# Patient Record
Sex: FEMALE | Race: WHITE | Employment: FULL TIME | URBAN - METROPOLITAN AREA
[De-identification: names, ages, dates, MRNs, and addresses within clinical notes are randomized per-mention and may not be internally consistent; named-entity substitution may affect disease eponyms.]

---

## 2017-02-24 ENCOUNTER — LAB ENCOUNTER (OUTPATIENT)
Dept: LAB | Age: 57
End: 2017-02-24
Attending: FAMILY MEDICINE
Payer: COMMERCIAL

## 2017-02-24 ENCOUNTER — OFFICE VISIT (OUTPATIENT)
Dept: INTERNAL MEDICINE CLINIC | Facility: CLINIC | Age: 57
End: 2017-02-24

## 2017-02-24 VITALS
HEIGHT: 66 IN | TEMPERATURE: 98 F | OXYGEN SATURATION: 97 % | WEIGHT: 166.75 LBS | HEART RATE: 68 BPM | SYSTOLIC BLOOD PRESSURE: 116 MMHG | BODY MASS INDEX: 26.8 KG/M2 | RESPIRATION RATE: 16 BRPM | DIASTOLIC BLOOD PRESSURE: 78 MMHG

## 2017-02-24 DIAGNOSIS — Z00.00 WELL ADULT EXAM: ICD-10-CM

## 2017-02-24 DIAGNOSIS — Z12.31 VISIT FOR SCREENING MAMMOGRAM: ICD-10-CM

## 2017-02-24 DIAGNOSIS — E87.6 POTASSIUM (K) DEFICIENCY: ICD-10-CM

## 2017-02-24 DIAGNOSIS — Z00.00 LABORATORY EXAM ORDERED AS PART OF ROUTINE GENERAL MEDICAL EXAMINATION: ICD-10-CM

## 2017-02-24 DIAGNOSIS — Z00.00 WELL ADULT EXAM: Primary | ICD-10-CM

## 2017-02-24 LAB
25-HYDROXYVITAMIN D (TOTAL): 58.2 NG/ML (ref 30–100)
ALBUMIN SERPL-MCNC: 4.5 G/DL (ref 3.5–4.8)
ALP LIVER SERPL-CCNC: 69 U/L (ref 46–118)
ALT SERPL-CCNC: 38 U/L (ref 14–54)
AST SERPL-CCNC: 24 U/L (ref 15–41)
BASOPHILS # BLD AUTO: 0.08 X10(3) UL (ref 0–0.1)
BASOPHILS NFR BLD AUTO: 1 %
BILIRUB SERPL-MCNC: 0.7 MG/DL (ref 0.1–2)
BILIRUB UR QL STRIP.AUTO: NEGATIVE
BUN BLD-MCNC: 19 MG/DL (ref 8–20)
CALCIUM BLD-MCNC: 9.7 MG/DL (ref 8.3–10.3)
CHLORIDE: 102 MMOL/L (ref 101–111)
CHOLEST SMN-MCNC: 127 MG/DL (ref ?–200)
CLARITY UR REFRACT.AUTO: CLEAR
CO2: 28 MMOL/L (ref 22–32)
COLOR UR AUTO: YELLOW
CREAT BLD-MCNC: 0.9 MG/DL (ref 0.55–1.02)
EOSINOPHIL # BLD AUTO: 0.26 X10(3) UL (ref 0–0.3)
EOSINOPHIL NFR BLD AUTO: 3.3 %
ERYTHROCYTE [DISTWIDTH] IN BLOOD BY AUTOMATED COUNT: 12.4 % (ref 11.5–16)
GLUCOSE BLD-MCNC: 82 MG/DL (ref 70–99)
GLUCOSE UR STRIP.AUTO-MCNC: NEGATIVE MG/DL
HCT VFR BLD AUTO: 45.3 % (ref 34–50)
HDLC SERPL-MCNC: 68 MG/DL (ref 45–?)
HDLC SERPL: 1.87 {RATIO} (ref ?–4.44)
HGB BLD-MCNC: 15.3 G/DL (ref 12–16)
IMMATURE GRANULOCYTE COUNT: 0.01 X10(3) UL (ref 0–1)
IMMATURE GRANULOCYTE RATIO %: 0.1 %
KETONES UR STRIP.AUTO-MCNC: NEGATIVE MG/DL
LDLC SERPL CALC-MCNC: 46 MG/DL (ref ?–130)
LEUKOCYTE ESTERASE UR QL STRIP.AUTO: NEGATIVE
LYMPHOCYTES # BLD AUTO: 2.43 X10(3) UL (ref 0.9–4)
LYMPHOCYTES NFR BLD AUTO: 30.8 %
M PROTEIN MFR SERPL ELPH: 7.6 G/DL (ref 6.1–8.3)
MCH RBC QN AUTO: 31.2 PG (ref 27–33.2)
MCHC RBC AUTO-ENTMCNC: 33.8 G/DL (ref 31–37)
MCV RBC AUTO: 92.4 FL (ref 81–100)
MONOCYTES # BLD AUTO: 0.69 X10(3) UL (ref 0.1–0.6)
MONOCYTES NFR BLD AUTO: 8.8 %
NEUTROPHIL ABS PRELIM: 4.41 X10 (3) UL (ref 1.3–6.7)
NEUTROPHILS # BLD AUTO: 4.41 X10(3) UL (ref 1.3–6.7)
NEUTROPHILS NFR BLD AUTO: 56 %
NITRITE UR QL STRIP.AUTO: NEGATIVE
NONHDLC SERPL-MCNC: 59 MG/DL (ref ?–130)
PH UR STRIP.AUTO: 6 [PH] (ref 4.5–8)
PLATELET # BLD AUTO: 296 10(3)UL (ref 150–450)
POTASSIUM SERPL-SCNC: 3.5 MMOL/L (ref 3.6–5.1)
PROT UR STRIP.AUTO-MCNC: NEGATIVE MG/DL
RBC # BLD AUTO: 4.9 X10(6)UL (ref 3.8–5.1)
RBC UR QL AUTO: NEGATIVE
RED CELL DISTRIBUTION WIDTH-SD: 41.9 FL (ref 35.1–46.3)
SODIUM SERPL-SCNC: 137 MMOL/L (ref 136–144)
SP GR UR STRIP.AUTO: 1.02 (ref 1–1.03)
TRIGLYCERIDES: 67 MG/DL (ref ?–150)
TSI SER-ACNC: 4.79 MIU/ML (ref 0.35–5.5)
UROBILINOGEN UR STRIP.AUTO-MCNC: <2 MG/DL
VLDL: 13 MG/DL (ref 5–40)
WBC # BLD AUTO: 7.9 X10(3) UL (ref 4–13)

## 2017-02-24 PROCEDURE — 82306 VITAMIN D 25 HYDROXY: CPT

## 2017-02-24 PROCEDURE — 36415 COLL VENOUS BLD VENIPUNCTURE: CPT

## 2017-02-24 PROCEDURE — 80053 COMPREHEN METABOLIC PANEL: CPT

## 2017-02-24 PROCEDURE — 81003 URINALYSIS AUTO W/O SCOPE: CPT

## 2017-02-24 PROCEDURE — 80061 LIPID PANEL: CPT

## 2017-02-24 PROCEDURE — 85025 COMPLETE CBC W/AUTO DIFF WBC: CPT

## 2017-02-24 PROCEDURE — 99396 PREV VISIT EST AGE 40-64: CPT | Performed by: FAMILY MEDICINE

## 2017-02-24 PROCEDURE — 84443 ASSAY THYROID STIM HORMONE: CPT

## 2017-02-24 RX ORDER — PRAVASTATIN SODIUM 40 MG
40 TABLET ORAL
Qty: 90 TABLET | Refills: 1 | Status: SHIPPED | OUTPATIENT
Start: 2017-02-24 | End: 2017-08-28

## 2017-02-24 RX ORDER — LEVOTHYROXINE SODIUM 112 UG/1
112 TABLET ORAL
Qty: 90 TABLET | Refills: 1 | Status: SHIPPED | OUTPATIENT
Start: 2017-02-24 | End: 2017-08-28

## 2017-02-24 RX ORDER — VALSARTAN AND HYDROCHLOROTHIAZIDE 320; 12.5 MG/1; MG/1
1 TABLET, FILM COATED ORAL DAILY
Qty: 90 TABLET | Refills: 1 | Status: SHIPPED | OUTPATIENT
Start: 2017-02-24 | End: 2017-07-29

## 2017-02-24 NOTE — PROGRESS NOTES
HPI:    Patient ID: Naomi Ramos is a 60year old female.     HPI  HPI:   Naomi Ramos is a 60year old female who presents for a complete physical exam.     Wt Readings from Last 6 Encounters:  02/24/17 : 166 lb 12 oz  09/02/16 : 165 lb  08/03/16 : 160 lb Comment w/rmoval of both ovaries d/t fibroids    HYSTERECTOMY  2010    Comment total hystero    OOPHORECTOMY Bilateral 2010    Comment total hystero.       Family History   Problem Relation Age of Onset   • cad[other] [OTHER] Brother    • cad[other] Jonnathan Wilkinson clear  NECK: supple,no adenopathy,  CHEST: no chest tenderness  BREAST: no dominant or suspicious mass  LUNGS: clear to auscultation  CARDIO: RRR without murmur  GI: good BS's,no masses, HSM or tenderness  MUSCULOSKELETAL: back is not tender,FROM of the ba prescriptions requested or ordered in this encounter       Imaging & Referrals:  [facility] SCREENING BILAT (CPT=[id number])       YV#[id number]

## 2017-02-27 RX ORDER — POTASSIUM CHLORIDE 20 MEQ/1
20 TABLET, EXTENDED RELEASE ORAL 2 TIMES DAILY
Qty: 4 TABLET | Refills: 0 | Status: SHIPPED | OUTPATIENT
Start: 2017-02-27 | End: 2017-08-28

## 2017-02-28 ENCOUNTER — HOSPITAL ENCOUNTER (OUTPATIENT)
Dept: MAMMOGRAPHY | Age: 57
Discharge: HOME | End: 2017-02-28
Attending: FAMILY MEDICINE
Payer: COMMERCIAL

## 2017-02-28 DIAGNOSIS — Z12.31 VISIT FOR SCREENING MAMMOGRAM: ICD-10-CM

## 2017-02-28 PROCEDURE — 77067 SCR MAMMO BI INCL CAD: CPT

## 2017-03-06 ENCOUNTER — HOSPITAL ENCOUNTER (OUTPATIENT)
Dept: MAMMOGRAPHY | Facility: HOSPITAL | Age: 57
Discharge: HOME | End: 2017-03-06
Attending: FAMILY MEDICINE
Payer: COMMERCIAL

## 2017-03-06 DIAGNOSIS — R92.8 ABNORMAL MAMMOGRAM OF LEFT BREAST: ICD-10-CM

## 2017-03-06 PROCEDURE — 77065 DX MAMMO INCL CAD UNI: CPT

## 2017-03-06 PROCEDURE — 77061 BREAST TOMOSYNTHESIS UNI: CPT

## 2017-03-06 PROCEDURE — 76642 ULTRASOUND BREAST LIMITED: CPT

## 2017-04-06 ENCOUNTER — APPOINTMENT (OUTPATIENT)
Dept: LAB | Age: 57
End: 2017-04-06
Attending: FAMILY MEDICINE
Payer: COMMERCIAL

## 2017-04-06 DIAGNOSIS — E87.6 POTASSIUM (K) DEFICIENCY: ICD-10-CM

## 2017-04-06 PROCEDURE — 36415 COLL VENOUS BLD VENIPUNCTURE: CPT

## 2017-04-06 PROCEDURE — 84132 ASSAY OF SERUM POTASSIUM: CPT

## 2017-05-03 RX ORDER — PRAVASTATIN SODIUM 40 MG
TABLET ORAL
Qty: 90 TABLET | Refills: 0 | Status: SHIPPED | OUTPATIENT
Start: 2017-05-03 | End: 2017-08-28

## 2017-05-03 RX ORDER — LEVOTHYROXINE SODIUM 112 UG/1
TABLET ORAL
Qty: 90 TABLET | Refills: 0 | Status: SHIPPED | OUTPATIENT
Start: 2017-05-03 | End: 2017-08-28

## 2017-07-31 RX ORDER — VALSARTAN AND HYDROCHLOROTHIAZIDE 320; 12.5 MG/1; MG/1
1 TABLET, FILM COATED ORAL DAILY
Qty: 90 TABLET | Refills: 0 | Status: SHIPPED | OUTPATIENT
Start: 2017-07-31 | End: 2017-08-28

## 2017-08-28 ENCOUNTER — OFFICE VISIT (OUTPATIENT)
Dept: INTERNAL MEDICINE CLINIC | Facility: CLINIC | Age: 57
End: 2017-08-28

## 2017-08-28 VITALS
BODY MASS INDEX: 26.2 KG/M2 | DIASTOLIC BLOOD PRESSURE: 68 MMHG | RESPIRATION RATE: 13 BRPM | TEMPERATURE: 98 F | WEIGHT: 163 LBS | SYSTOLIC BLOOD PRESSURE: 118 MMHG | HEIGHT: 66 IN | HEART RATE: 70 BPM

## 2017-08-28 DIAGNOSIS — E78.00 PURE HYPERCHOLESTEROLEMIA: ICD-10-CM

## 2017-08-28 DIAGNOSIS — I10 ESSENTIAL HYPERTENSION, BENIGN: Primary | ICD-10-CM

## 2017-08-28 DIAGNOSIS — N60.02 BREAST CYST, LEFT: ICD-10-CM

## 2017-08-28 PROCEDURE — 99213 OFFICE O/P EST LOW 20 MIN: CPT | Performed by: FAMILY MEDICINE

## 2017-08-28 RX ORDER — VALSARTAN AND HYDROCHLOROTHIAZIDE 320; 12.5 MG/1; MG/1
1 TABLET, FILM COATED ORAL DAILY
Qty: 90 TABLET | Refills: 1 | Status: SHIPPED | OUTPATIENT
Start: 2017-08-28 | End: 2018-03-08

## 2017-08-28 RX ORDER — PRAVASTATIN SODIUM 40 MG
40 TABLET ORAL
Qty: 90 TABLET | Refills: 1 | Status: SHIPPED | OUTPATIENT
Start: 2017-08-28 | End: 2018-03-08

## 2017-08-28 RX ORDER — LEVOTHYROXINE SODIUM 112 UG/1
112 TABLET ORAL
Qty: 90 TABLET | Refills: 1 | Status: SHIPPED | OUTPATIENT
Start: 2017-08-28 | End: 2018-03-08

## 2017-08-28 NOTE — PROGRESS NOTES
HPI:    Patient ID: Beni Mcdaniel is a 60year old female. HPI  Beni Mcdaniel is a 60year old female. HPI:   Patient presents for recheck of her hypertension.  Pt has been taking medications as instructed, no medication side effects, home BP monitorin Oral Cap Take  by mouth every other day.  Disp:  Rfl:       Past Medical History:   Diagnosis Date   • Osteoarthrosis, unspecified whether generalized or localized, lower leg    • Pure hypercholesterolemia 1/19/2013      Past Surgical History:  12/2008: COL Pravastatin Sodium 40 MG Oral Tab Take 1 tablet (40 mg total) by mouth once daily. Disp: 90 tablet Rfl: 1   Levothyroxine Sodium 112 MCG Oral Tab Take 1 tablet (112 mcg total) by mouth once daily.  Disp: 90 tablet Rfl: 1   aspirin 81 MG Oral Tab Take 81 m

## 2017-09-15 ENCOUNTER — HOSPITAL ENCOUNTER (OUTPATIENT)
Dept: MAMMOGRAPHY | Facility: HOSPITAL | Age: 57
Discharge: HOME | End: 2017-09-15
Attending: FAMILY MEDICINE
Payer: COMMERCIAL

## 2017-09-15 DIAGNOSIS — N60.02 BREAST CYST, LEFT: ICD-10-CM

## 2017-09-15 PROCEDURE — 77061 BREAST TOMOSYNTHESIS UNI: CPT | Performed by: FAMILY MEDICINE

## 2017-09-15 PROCEDURE — 76642 ULTRASOUND BREAST LIMITED: CPT | Performed by: FAMILY MEDICINE

## 2017-09-15 PROCEDURE — 77065 DX MAMMO INCL CAD UNI: CPT | Performed by: FAMILY MEDICINE

## 2017-11-06 RX ORDER — LEVOTHYROXINE SODIUM 112 UG/1
TABLET ORAL
Qty: 90 TABLET | Refills: 0 | Status: SHIPPED | OUTPATIENT
Start: 2017-11-06 | End: 2018-03-08

## 2018-02-17 RX ORDER — LEVOTHYROXINE SODIUM 112 UG/1
TABLET ORAL
Qty: 90 TABLET | Refills: 0 | Status: SHIPPED | OUTPATIENT
Start: 2018-02-17 | End: 2018-03-08

## 2018-03-08 ENCOUNTER — LAB ENCOUNTER (OUTPATIENT)
Dept: LAB | Age: 58
End: 2018-03-08
Attending: FAMILY MEDICINE
Payer: COMMERCIAL

## 2018-03-08 ENCOUNTER — OFFICE VISIT (OUTPATIENT)
Dept: INTERNAL MEDICINE CLINIC | Facility: CLINIC | Age: 58
End: 2018-03-08

## 2018-03-08 VITALS
DIASTOLIC BLOOD PRESSURE: 80 MMHG | HEART RATE: 60 BPM | OXYGEN SATURATION: 100 % | RESPIRATION RATE: 12 BRPM | HEIGHT: 66.5 IN | TEMPERATURE: 98 F | SYSTOLIC BLOOD PRESSURE: 126 MMHG | WEIGHT: 164 LBS | BODY MASS INDEX: 26.05 KG/M2

## 2018-03-08 DIAGNOSIS — Z00.00 LABORATORY EXAM ORDERED AS PART OF ROUTINE GENERAL MEDICAL EXAMINATION: ICD-10-CM

## 2018-03-08 DIAGNOSIS — I10 ESSENTIAL HYPERTENSION, BENIGN: ICD-10-CM

## 2018-03-08 DIAGNOSIS — Z12.31 VISIT FOR SCREENING MAMMOGRAM: ICD-10-CM

## 2018-03-08 DIAGNOSIS — Z00.00 WELLNESS EXAMINATION: Primary | ICD-10-CM

## 2018-03-08 LAB
ALBUMIN SERPL-MCNC: 4.2 G/DL (ref 3.5–4.8)
ALP LIVER SERPL-CCNC: 69 U/L (ref 46–118)
ALT SERPL-CCNC: 25 U/L (ref 14–54)
AST SERPL-CCNC: 15 U/L (ref 15–41)
BASOPHILS # BLD AUTO: 0.07 X10(3) UL (ref 0–0.1)
BASOPHILS NFR BLD AUTO: 1.2 %
BILIRUB SERPL-MCNC: 0.5 MG/DL (ref 0.1–2)
BILIRUB UR QL STRIP.AUTO: NEGATIVE
BUN BLD-MCNC: 20 MG/DL (ref 8–20)
CALCIUM BLD-MCNC: 9.1 MG/DL (ref 8.3–10.3)
CHLORIDE: 103 MMOL/L (ref 101–111)
CHOLEST SMN-MCNC: 137 MG/DL (ref ?–200)
CLARITY UR REFRACT.AUTO: CLEAR
CO2: 28 MMOL/L (ref 22–32)
COLOR UR AUTO: YELLOW
CREAT BLD-MCNC: 0.77 MG/DL (ref 0.55–1.02)
EOSINOPHIL # BLD AUTO: 0.19 X10(3) UL (ref 0–0.3)
EOSINOPHIL NFR BLD AUTO: 3.3 %
ERYTHROCYTE [DISTWIDTH] IN BLOOD BY AUTOMATED COUNT: 13.2 % (ref 11.5–16)
GLUCOSE BLD-MCNC: 85 MG/DL (ref 70–99)
GLUCOSE UR STRIP.AUTO-MCNC: NEGATIVE MG/DL
HCT VFR BLD AUTO: 42.9 % (ref 34–50)
HDLC SERPL-MCNC: 53 MG/DL (ref 45–?)
HDLC SERPL: 2.58 {RATIO} (ref ?–4.44)
HEPATITIS C VIRUS AB INTERPRETATION: NONREACTIVE
HGB BLD-MCNC: 13.8 G/DL (ref 12–16)
IMMATURE GRANULOCYTE COUNT: 0.01 X10(3) UL (ref 0–1)
IMMATURE GRANULOCYTE RATIO %: 0.2 %
KETONES UR STRIP.AUTO-MCNC: NEGATIVE MG/DL
LDLC SERPL CALC-MCNC: 71 MG/DL (ref ?–130)
LEUKOCYTE ESTERASE UR QL STRIP.AUTO: NEGATIVE
LYMPHOCYTES # BLD AUTO: 2.03 X10(3) UL (ref 0.9–4)
LYMPHOCYTES NFR BLD AUTO: 35 %
M PROTEIN MFR SERPL ELPH: 7.4 G/DL (ref 6.1–8.3)
MCH RBC QN AUTO: 28.3 PG (ref 27–33.2)
MCHC RBC AUTO-ENTMCNC: 32.2 G/DL (ref 31–37)
MCV RBC AUTO: 87.9 FL (ref 81–100)
MONOCYTES # BLD AUTO: 0.47 X10(3) UL (ref 0.1–1)
MONOCYTES NFR BLD AUTO: 8.1 %
NEUTROPHIL ABS PRELIM: 3.03 X10 (3) UL (ref 1.3–6.7)
NEUTROPHILS # BLD AUTO: 3.03 X10(3) UL (ref 1.3–6.7)
NEUTROPHILS NFR BLD AUTO: 52.2 %
NITRITE UR QL STRIP.AUTO: NEGATIVE
NONHDLC SERPL-MCNC: 84 MG/DL (ref ?–130)
PH UR STRIP.AUTO: 7 [PH] (ref 4.5–8)
PLATELET # BLD AUTO: 267 10(3)UL (ref 150–450)
POTASSIUM SERPL-SCNC: 3.8 MMOL/L (ref 3.6–5.1)
PROT UR STRIP.AUTO-MCNC: NEGATIVE MG/DL
RBC # BLD AUTO: 4.88 X10(6)UL (ref 3.8–5.1)
RBC UR QL AUTO: NEGATIVE
RED CELL DISTRIBUTION WIDTH-SD: 42.4 FL (ref 35.1–46.3)
SODIUM SERPL-SCNC: 139 MMOL/L (ref 136–144)
SP GR UR STRIP.AUTO: 1.02 (ref 1–1.03)
TRIGL SERPL-MCNC: 67 MG/DL (ref ?–150)
TSI SER-ACNC: 1.33 MIU/ML (ref 0.35–5.5)
UROBILINOGEN UR STRIP.AUTO-MCNC: <2 MG/DL
VLDLC SERPL CALC-MCNC: 13 MG/DL (ref 5–40)
WBC # BLD AUTO: 5.8 X10(3) UL (ref 4–13)

## 2018-03-08 PROCEDURE — 80061 LIPID PANEL: CPT

## 2018-03-08 PROCEDURE — 36415 COLL VENOUS BLD VENIPUNCTURE: CPT

## 2018-03-08 PROCEDURE — 84443 ASSAY THYROID STIM HORMONE: CPT

## 2018-03-08 PROCEDURE — 80053 COMPREHEN METABOLIC PANEL: CPT

## 2018-03-08 PROCEDURE — 80050 GENERAL HEALTH PANEL: CPT

## 2018-03-08 PROCEDURE — 86803 HEPATITIS C AB TEST: CPT

## 2018-03-08 PROCEDURE — 81003 URINALYSIS AUTO W/O SCOPE: CPT

## 2018-03-08 PROCEDURE — 99396 PREV VISIT EST AGE 40-64: CPT | Performed by: FAMILY MEDICINE

## 2018-03-08 RX ORDER — LEVOTHYROXINE SODIUM 112 UG/1
112 TABLET ORAL
Qty: 90 TABLET | Refills: 1 | Status: SHIPPED | OUTPATIENT
Start: 2018-03-08 | End: 2018-09-10

## 2018-03-08 RX ORDER — VALSARTAN AND HYDROCHLOROTHIAZIDE 320; 12.5 MG/1; MG/1
1 TABLET, FILM COATED ORAL DAILY
Qty: 90 TABLET | Refills: 1 | Status: SHIPPED | OUTPATIENT
Start: 2018-03-08 | End: 2018-09-10

## 2018-03-08 RX ORDER — PRAVASTATIN SODIUM 40 MG
40 TABLET ORAL
Qty: 90 TABLET | Refills: 1 | Status: SHIPPED | OUTPATIENT
Start: 2018-03-08 | End: 2018-09-10

## 2018-03-08 NOTE — PROGRESS NOTES
HPI:    Patient ID: Sherita Bailey is a 59year old female.     HPI  HPI:   Sherita Bailey is a 59year old female who presents for a complete physical exam. .      Wt Readings from Last 6 Encounters:  03/08/18 : 164 lb  08/28/17 : 163 lb  02/24/17 : 166 lb 12 localized, lower leg    • Pure hypercholesterolemia 1/19/2013      Past Surgical History:  12/2008: COLONOSCOPY      Comment: repeat in 10 years  2010: HYSTERECTOMY      Comment: total hystero  No date: LAPAROSCOPY,DIAGNOSTIC  2010: OOPHORECTOMY Bilateral asthma    EXAM:   /80 (BP Location: Left arm, Patient Position: Sitting, Cuff Size: large)   Pulse 60   Temp 97.7 °F (36.5 °C) (Oral)   Resp 12   Ht 66.5\"   Wt 164 lb   SpO2 100%   BMI 26.07 kg/m²   Body mass index is 26.07 kg/m².    GENERAL: well de 1000 UNITS Oral Cap Take  by mouth every other day.  Disp:  Rfl:      Allergies:  Seasonal                   PHYSICAL EXAM:   Physical Exam           ASSESSMENT/PLAN:   Wellness examination  (primary encounter diagnosis)  Laboratory exam ordered as part of

## 2018-03-24 ENCOUNTER — HOSPITAL ENCOUNTER (OUTPATIENT)
Dept: MAMMOGRAPHY | Facility: HOSPITAL | Age: 58
Discharge: HOME | End: 2018-03-24
Attending: FAMILY MEDICINE
Payer: COMMERCIAL

## 2018-03-24 DIAGNOSIS — Z12.31 VISIT FOR SCREENING MAMMOGRAM: ICD-10-CM

## 2018-03-24 PROCEDURE — 77067 SCR MAMMO BI INCL CAD: CPT | Performed by: FAMILY MEDICINE

## 2018-03-24 PROCEDURE — 77063 BREAST TOMOSYNTHESIS BI: CPT | Performed by: FAMILY MEDICINE

## 2018-04-21 ENCOUNTER — HOSPITAL ENCOUNTER (OUTPATIENT)
Dept: CT IMAGING | Facility: HOSPITAL | Age: 58
Discharge: HOME | End: 2018-04-21
Attending: INTERNAL MEDICINE

## 2018-04-21 DIAGNOSIS — Z13.6 SCREENING FOR CARDIOVASCULAR CONDITION: ICD-10-CM

## 2018-05-14 RX ORDER — VALSARTAN AND HYDROCHLOROTHIAZIDE 320; 12.5 MG/1; MG/1
1 TABLET, FILM COATED ORAL DAILY
Qty: 90 TABLET | Refills: 0 | OUTPATIENT
Start: 2018-05-14

## 2018-09-10 ENCOUNTER — OFFICE VISIT (OUTPATIENT)
Dept: INTERNAL MEDICINE CLINIC | Facility: CLINIC | Age: 58
End: 2018-09-10
Payer: COMMERCIAL

## 2018-09-10 VITALS
HEIGHT: 66 IN | OXYGEN SATURATION: 97 % | BODY MASS INDEX: 26.52 KG/M2 | HEART RATE: 66 BPM | WEIGHT: 165 LBS | SYSTOLIC BLOOD PRESSURE: 128 MMHG | DIASTOLIC BLOOD PRESSURE: 80 MMHG | RESPIRATION RATE: 12 BRPM | TEMPERATURE: 98 F

## 2018-09-10 DIAGNOSIS — E78.00 PURE HYPERCHOLESTEROLEMIA: ICD-10-CM

## 2018-09-10 DIAGNOSIS — I10 ESSENTIAL HYPERTENSION, BENIGN: Primary | ICD-10-CM

## 2018-09-10 DIAGNOSIS — E55.9 VITAMIN D DEFICIENCY: ICD-10-CM

## 2018-09-10 DIAGNOSIS — Z12.11 COLON CANCER SCREENING: ICD-10-CM

## 2018-09-10 DIAGNOSIS — E03.9 HYPOTHYROIDISM, UNSPECIFIED TYPE: ICD-10-CM

## 2018-09-10 PROCEDURE — 99214 OFFICE O/P EST MOD 30 MIN: CPT | Performed by: FAMILY MEDICINE

## 2018-09-10 RX ORDER — IRBESARTAN AND HYDROCHLOROTHIAZIDE 300; 12.5 MG/1; MG/1
1 TABLET, FILM COATED ORAL DAILY
Qty: 90 TABLET | Refills: 1 | Status: SHIPPED | OUTPATIENT
Start: 2018-09-10 | End: 2019-04-03

## 2018-09-10 RX ORDER — LEVOTHYROXINE SODIUM 112 UG/1
112 TABLET ORAL
Qty: 90 TABLET | Refills: 1 | Status: SHIPPED | OUTPATIENT
Start: 2018-09-10 | End: 2020-06-22

## 2018-09-10 RX ORDER — PRAVASTATIN SODIUM 40 MG
40 TABLET ORAL
Qty: 90 TABLET | Refills: 1 | Status: SHIPPED | OUTPATIENT
Start: 2018-09-10 | End: 2019-04-03

## 2018-09-10 NOTE — PROGRESS NOTES
HPI:    Patient ID: Elizabeth Mesa is a 59year old female.     HPI  Here for med ck   overall feels well    Taking meds   No c/o   Needs refills    Breathing is good  No CP  No SOB   Aware of colonoscopy due this yr   Asking about flu shot and whooping cough (E78.00) Pure hypercholesterolemia  Plan: brenda next visit       (E55.9) Vitamin D deficiency  Plan: brenda next visit       (Z12.11) Colon cancer screening  Plan: due this yr  Dr Jacek Street # given        Essential hypertension, benign  (primary encounter diag

## 2018-11-21 RX ORDER — LEVOTHYROXINE SODIUM 112 UG/1
TABLET ORAL
Qty: 90 TABLET | Refills: 0 | Status: SHIPPED | OUTPATIENT
Start: 2018-11-21 | End: 2019-02-20

## 2019-01-28 PROBLEM — Z12.11 SPECIAL SCREENING FOR MALIGNANT NEOPLASM OF COLON: Status: ACTIVE | Noted: 2019-01-28

## 2019-02-20 RX ORDER — LEVOTHYROXINE SODIUM 112 UG/1
TABLET ORAL
Qty: 90 TABLET | Refills: 0 | Status: SHIPPED | OUTPATIENT
Start: 2019-02-20 | End: 2019-04-03

## 2019-02-20 NOTE — TELEPHONE ENCOUNTER
Refill requested:   Requested Prescriptions     Pending Prescriptions Disp Refills   • LEVOTHYROXINE SODIUM 112 MCG Oral Tab [Pharmacy Med Name: LEVOTHYROXINE 0.112MG (112MCG) TABS] 90 tablet 0     Sig: TAKE 1 TABLET(112 MCG) BY MOUTH EVERY DAY     Passed

## 2019-04-03 ENCOUNTER — LAB ENCOUNTER (OUTPATIENT)
Dept: LAB | Age: 59
End: 2019-04-03
Attending: FAMILY MEDICINE
Payer: COMMERCIAL

## 2019-04-03 ENCOUNTER — OFFICE VISIT (OUTPATIENT)
Dept: INTERNAL MEDICINE CLINIC | Facility: CLINIC | Age: 59
End: 2019-04-03
Payer: COMMERCIAL

## 2019-04-03 VITALS
WEIGHT: 163.5 LBS | DIASTOLIC BLOOD PRESSURE: 80 MMHG | OXYGEN SATURATION: 98 % | BODY MASS INDEX: 26.28 KG/M2 | TEMPERATURE: 99 F | RESPIRATION RATE: 16 BRPM | SYSTOLIC BLOOD PRESSURE: 126 MMHG | HEIGHT: 66 IN | HEART RATE: 65 BPM

## 2019-04-03 DIAGNOSIS — Z12.31 ENCOUNTER FOR SCREENING MAMMOGRAM FOR BREAST CANCER: ICD-10-CM

## 2019-04-03 DIAGNOSIS — M25.561 CHRONIC PAIN OF BOTH KNEES: ICD-10-CM

## 2019-04-03 DIAGNOSIS — E55.9 VITAMIN D DEFICIENCY: ICD-10-CM

## 2019-04-03 DIAGNOSIS — M25.562 CHRONIC PAIN OF BOTH KNEES: ICD-10-CM

## 2019-04-03 DIAGNOSIS — E78.00 PURE HYPERCHOLESTEROLEMIA: ICD-10-CM

## 2019-04-03 DIAGNOSIS — I83.93 ASYMPTOMATIC VARICOSE VEINS OF BOTH LOWER EXTREMITIES: ICD-10-CM

## 2019-04-03 DIAGNOSIS — G89.29 CHRONIC PAIN OF BOTH KNEES: ICD-10-CM

## 2019-04-03 DIAGNOSIS — E03.9 HYPOTHYROIDISM, UNSPECIFIED TYPE: ICD-10-CM

## 2019-04-03 DIAGNOSIS — Z00.00 WELLNESS EXAMINATION: Primary | ICD-10-CM

## 2019-04-03 DIAGNOSIS — Z00.00 LABORATORY EXAM ORDERED AS PART OF ROUTINE GENERAL MEDICAL EXAMINATION: ICD-10-CM

## 2019-04-03 PROCEDURE — 99396 PREV VISIT EST AGE 40-64: CPT | Performed by: FAMILY MEDICINE

## 2019-04-03 PROCEDURE — 81003 URINALYSIS AUTO W/O SCOPE: CPT

## 2019-04-03 PROCEDURE — 85025 COMPLETE CBC W/AUTO DIFF WBC: CPT

## 2019-04-03 PROCEDURE — 36415 COLL VENOUS BLD VENIPUNCTURE: CPT

## 2019-04-03 PROCEDURE — 80053 COMPREHEN METABOLIC PANEL: CPT

## 2019-04-03 PROCEDURE — 84443 ASSAY THYROID STIM HORMONE: CPT

## 2019-04-03 PROCEDURE — 82306 VITAMIN D 25 HYDROXY: CPT

## 2019-04-03 PROCEDURE — 80061 LIPID PANEL: CPT

## 2019-04-03 RX ORDER — IRBESARTAN AND HYDROCHLOROTHIAZIDE 300; 12.5 MG/1; MG/1
1 TABLET, FILM COATED ORAL DAILY
Qty: 90 TABLET | Refills: 3 | Status: SHIPPED | OUTPATIENT
Start: 2019-04-03 | End: 2019-10-16

## 2019-04-03 RX ORDER — PRAVASTATIN SODIUM 40 MG
40 TABLET ORAL
Qty: 90 TABLET | Refills: 3 | Status: SHIPPED | OUTPATIENT
Start: 2019-04-03 | End: 2020-04-15

## 2019-04-03 NOTE — PROGRESS NOTES
HPI:    Patient ID: Rudi Do is a 59year old female.     HPI  HPI:   Rudi Do is a 59year old female who presents for a complete physical exam.     Wt Readings from Last 6 Encounters:  04/03/19 : 163 lb 8 oz  01/09/19 : 165 lb  09/10/18 : 165 lb • Decorative tattoo    • Flatulence/gas pain/belching    • High cholesterol    • Leaking of urine    • Osteoarthrosis, unspecified whether generalized or localized, lower leg    • Pain in joints    • Pure hypercholesterolemia 1/19/2013   • Rash    • Wea knee pains  HEMATOLOGIC: denies hx of anemia  ENDOCRINE: denies thyroid history  ALL/ASTHMA: denies hx of allergy or asthma    EXAM:   /80   Pulse 65   Temp 98.5 °F (36.9 °C) (Oral)   Resp 16   Ht 66\"   Wt 163 lb 8 oz   LMP  (LMP Unknown)   SpO2 98% (VITAMIN D) 1000 UNITS Oral Cap Take  by mouth every other day.  Disp:  Rfl:      Allergies:  Seasonal                   PHYSICAL EXAM:   Physical Exam           ASSESSMENT/PLAN:   Encounter for screening mammogram for breast cancer  Vitamin d deficiency  P

## 2019-04-30 ENCOUNTER — HOSPITAL ENCOUNTER (OUTPATIENT)
Dept: MAMMOGRAPHY | Facility: HOSPITAL | Age: 59
Discharge: HOME | End: 2019-04-30
Attending: FAMILY MEDICINE
Payer: COMMERCIAL

## 2019-04-30 DIAGNOSIS — Z12.31 ENCOUNTER FOR SCREENING MAMMOGRAM FOR BREAST CANCER: ICD-10-CM

## 2019-04-30 PROCEDURE — 77063 BREAST TOMOSYNTHESIS BI: CPT | Performed by: FAMILY MEDICINE

## 2019-04-30 PROCEDURE — 77067 SCR MAMMO BI INCL CAD: CPT | Performed by: FAMILY MEDICINE

## 2019-05-20 ENCOUNTER — LAB ENCOUNTER (OUTPATIENT)
Dept: LAB | Age: 59
End: 2019-05-20
Attending: FAMILY MEDICINE
Payer: COMMERCIAL

## 2019-05-20 ENCOUNTER — OFFICE VISIT (OUTPATIENT)
Dept: INTERNAL MEDICINE CLINIC | Facility: CLINIC | Age: 59
End: 2019-05-20
Payer: COMMERCIAL

## 2019-05-20 ENCOUNTER — APPOINTMENT (OUTPATIENT)
Dept: LAB | Age: 59
End: 2019-05-20
Attending: FAMILY MEDICINE
Payer: COMMERCIAL

## 2019-05-20 ENCOUNTER — HOSPITAL ENCOUNTER (OUTPATIENT)
Dept: GENERAL RADIOLOGY | Age: 59
Discharge: HOME | End: 2019-05-20
Attending: FAMILY MEDICINE
Payer: COMMERCIAL

## 2019-05-20 VITALS
SYSTOLIC BLOOD PRESSURE: 128 MMHG | BODY MASS INDEX: 26.84 KG/M2 | HEIGHT: 66 IN | RESPIRATION RATE: 16 BRPM | TEMPERATURE: 98 F | OXYGEN SATURATION: 98 % | HEART RATE: 70 BPM | DIASTOLIC BLOOD PRESSURE: 80 MMHG | WEIGHT: 167 LBS

## 2019-05-20 DIAGNOSIS — Z01.818 PREOP EXAMINATION: ICD-10-CM

## 2019-05-20 DIAGNOSIS — Z01.818 PREOP EXAMINATION: Primary | ICD-10-CM

## 2019-05-20 PROCEDURE — 87088 URINE BACTERIA CULTURE: CPT

## 2019-05-20 PROCEDURE — 80053 COMPREHEN METABOLIC PANEL: CPT

## 2019-05-20 PROCEDURE — 36415 COLL VENOUS BLD VENIPUNCTURE: CPT

## 2019-05-20 PROCEDURE — 71046 X-RAY EXAM CHEST 2 VIEWS: CPT | Performed by: FAMILY MEDICINE

## 2019-05-20 PROCEDURE — 87186 SC STD MICRODIL/AGAR DIL: CPT

## 2019-05-20 PROCEDURE — 93010 ELECTROCARDIOGRAM REPORT: CPT | Performed by: INTERNAL MEDICINE

## 2019-05-20 PROCEDURE — 85025 COMPLETE CBC W/AUTO DIFF WBC: CPT

## 2019-05-20 PROCEDURE — 87086 URINE CULTURE/COLONY COUNT: CPT

## 2019-05-20 PROCEDURE — 81003 URINALYSIS AUTO W/O SCOPE: CPT

## 2019-05-20 PROCEDURE — 99243 OFF/OP CNSLTJ NEW/EST LOW 30: CPT | Performed by: FAMILY MEDICINE

## 2019-05-20 PROCEDURE — 93005 ELECTROCARDIOGRAM TRACING: CPT

## 2019-05-20 NOTE — PROGRESS NOTES
HPI:    Patient ID: Quynh Hanson is a 59year old female. HPI  Quynh Hanson is a 59year old female.   HPI:   Pt is here for her preop exam for her upcoming L knee surgery under the care of Dr Shirlene Alan    Has medial meniscus tear according to the MRI     Da Bilateral 2010    total hystero.    • OTHER SURGICAL HISTORY  9/2016    L foot sx    • TOTAL ABDOM HYSTERECTOMY      w/rmoval of both ovaries d/t fibroids        Social History:  Social History    Tobacco Use      Smoking status: Former [facility] Analilia Garcia (MULTIVITAL) Oral Tab Take 1 Tab by mouth daily. Disp:  Rfl:    Cholecalciferol (VITAMIN D) 1000 UNITS Oral Cap Take  by mouth every other day. Disp:  Rfl:    aspirin 81 MG Oral Tab Take 81 mg by mouth daily.  Disp:  Rfl:      Allergies:  Seasonal

## 2019-05-23 RX ORDER — CEPHALEXIN 500 MG/1
500 CAPSULE ORAL 2 TIMES DAILY
Qty: 14 CAPSULE | Refills: 0 | Status: SHIPPED | OUTPATIENT
Start: 2019-05-23 | End: 2019-05-30

## 2019-05-28 RX ORDER — LEVOTHYROXINE SODIUM 112 UG/1
TABLET ORAL
Qty: 90 TABLET | Refills: 0 | Status: CANCELLED | OUTPATIENT
Start: 2019-05-28

## 2019-05-29 ENCOUNTER — TELEPHONE (OUTPATIENT)
Dept: INTERNAL MEDICINE CLINIC | Facility: CLINIC | Age: 59
End: 2019-05-29

## 2019-05-29 RX ORDER — LEVOTHYROXINE SODIUM 112 UG/1
TABLET ORAL
Qty: 90 TABLET | Refills: 1 | Status: SHIPPED | OUTPATIENT
Start: 2019-05-29 | End: 2019-06-26

## 2019-05-29 NOTE — TELEPHONE ENCOUNTER
Advised pt use of probiotics , avoiding caffeine ,alcohol. pushing fluids . Pt has 5 pills left and no urinary symptoms with surgery tomorrow Ok to stop ?

## 2019-05-31 ENCOUNTER — ORDER TRANSCRIPTION (OUTPATIENT)
Dept: PHYSICAL THERAPY | Age: 59
End: 2019-05-31

## 2019-05-31 ENCOUNTER — APPOINTMENT (OUTPATIENT)
Dept: PHYSICAL THERAPY | Age: 59
End: 2019-05-31
Attending: ORTHOPAEDIC SURGERY
Payer: COMMERCIAL

## 2019-05-31 ENCOUNTER — OFFICE VISIT (OUTPATIENT)
Dept: PHYSICAL THERAPY | Age: 59
End: 2019-05-31
Attending: FAMILY MEDICINE
Payer: COMMERCIAL

## 2019-05-31 DIAGNOSIS — S83.249A MEDIAL MENISCUS TEAR: Primary | ICD-10-CM

## 2019-05-31 PROCEDURE — 97110 THERAPEUTIC EXERCISES: CPT

## 2019-05-31 PROCEDURE — 97161 PT EVAL LOW COMPLEX 20 MIN: CPT

## 2019-05-31 NOTE — PROGRESS NOTES
KNEE EVALUATION:   Referring Physician: Mikal Hall [facility]  Diagnosis: left knee medial meniscus tear, left knee pain     Date of Service: 5/31/2019     PATIENT SUMMARY   Russ Cagle is a 59year old y/o female who presents to therapy today with complai and stable characteristics. Precautions:  None  OBJECTIVE:   Gait: antalgic gait with straight cane  Observation/Skin: no signs of DVT or infection, swelling to knee joint noted.     Palpation: soft tissue restrictions to quad  Joint mobility: TF and PF therapist: Kate Reed, PT    [de-identified] certification required: Yes  I certify the need for these services furnished under this plan of treatment and while under my care.     X___________________________________________________ Date___________________

## 2019-05-31 NOTE — PATIENT INSTRUCTIONS
2- 3 times per week for 4-6 weeks  18 visits     Therapy Instructions     CKC VMO  Hip Abductor /extensor strengthening   Patellar Mobilizations  Hamstring and quadriceps stretching   Eccentric Hamstring contractions  Gait Training   Balance /[employer]

## 2019-06-04 ENCOUNTER — OFFICE VISIT (OUTPATIENT)
Dept: PHYSICAL THERAPY | Age: 59
End: 2019-06-04
Attending: INTERNAL MEDICINE
Payer: COMMERCIAL

## 2019-06-04 ENCOUNTER — APPOINTMENT (OUTPATIENT)
Dept: PHYSICAL THERAPY | Age: 59
End: 2019-06-04
Attending: ORTHOPAEDIC SURGERY
Payer: COMMERCIAL

## 2019-06-04 PROCEDURE — 97140 MANUAL THERAPY 1/> REGIONS: CPT | Performed by: PHYSICAL THERAPIST

## 2019-06-04 PROCEDURE — 97110 THERAPEUTIC EXERCISES: CPT | Performed by: PHYSICAL THERAPIST

## 2019-06-04 PROCEDURE — 97014 ELECTRIC STIMULATION THERAPY: CPT | Performed by: PHYSICAL THERAPIST

## 2019-06-04 NOTE — PROGRESS NOTES
Dx: L knee arthroscopy med menisectomy on 5/30/2019         Authorized # of Visits:  n/a         Next MD visit: none scheduled  Fall Risk: standard         Precautions: n/a           MD PROTOCOL: Closed chain VMO, hip abductor and extensor strengthening.  P

## 2019-06-05 ENCOUNTER — ORDER TRANSCRIPTION (OUTPATIENT)
Dept: PHYSICAL THERAPY | Age: 59
End: 2019-06-05

## 2019-06-05 DIAGNOSIS — S83.242D TEAR OF MEDIAL MENISCUS OF LEFT KNEE, SUBSEQUENT ENCOUNTER: Primary | ICD-10-CM

## 2019-06-06 ENCOUNTER — OFFICE VISIT (OUTPATIENT)
Dept: PHYSICAL THERAPY | Age: 59
End: 2019-06-06
Attending: INTERNAL MEDICINE
Payer: COMMERCIAL

## 2019-06-06 PROCEDURE — 97014 ELECTRIC STIMULATION THERAPY: CPT | Performed by: PHYSICAL THERAPIST

## 2019-06-06 PROCEDURE — 97110 THERAPEUTIC EXERCISES: CPT | Performed by: PHYSICAL THERAPIST

## 2019-06-06 NOTE — PROGRESS NOTES
Dx: L knee arthroscopy med menisectomy on 5/30/2019         Authorized # of Visits:  n/a         Next MD visit: none scheduled  Fall Risk: standard         Precautions: n/a           MD PROTOCOL: Closed chain VMO, hip abductor and extensor strengthening.  P flexion passive stretch X 5 mins        IFC with CP to L knee X 15 mins Standing TKE GTB X 20         ES (UNATTENDED) 15 MINS         IFC with CP to L knee X 15 mins              Charges: there ex X 3, ES (unattended) X 1       Total Timed Treatment: 45 mi

## 2019-06-07 ENCOUNTER — APPOINTMENT (OUTPATIENT)
Dept: PHYSICAL THERAPY | Age: 59
End: 2019-06-07
Attending: ORTHOPAEDIC SURGERY
Payer: COMMERCIAL

## 2019-06-11 ENCOUNTER — OFFICE VISIT (OUTPATIENT)
Dept: PHYSICAL THERAPY | Age: 59
End: 2019-06-11
Attending: PHYSICIAN ASSISTANT
Payer: COMMERCIAL

## 2019-06-11 ENCOUNTER — APPOINTMENT (OUTPATIENT)
Dept: PHYSICAL THERAPY | Age: 59
End: 2019-06-11
Attending: ORTHOPAEDIC SURGERY
Payer: COMMERCIAL

## 2019-06-11 PROCEDURE — 97110 THERAPEUTIC EXERCISES: CPT

## 2019-06-11 PROCEDURE — 97140 MANUAL THERAPY 1/> REGIONS: CPT

## 2019-06-11 NOTE — PROGRESS NOTES
Dx: L knee arthroscopy med menisectomy on 5/30/2019         Authorized # of Visits:  n/a         Next MD visit: none scheduled  Fall Risk: standard         Precautions: n/a           MD PROTOCOL: Closed chain VMO, hip abductor and extensor strengthening.  P Step up fwd/lat 4\" X 20 Step up fwd/lat 4\" X 20       minisquats X 20 minisquats X 20 minisquats X 20       Quad sets X 20 Quad sets X 20 Quad sets X 20       Heel slides X 20 Heel slides X 20 Heel slides X 20       MAN THERE 10 MINS SLR X 20 SLR X 20

## 2019-06-14 ENCOUNTER — APPOINTMENT (OUTPATIENT)
Dept: PHYSICAL THERAPY | Age: 59
End: 2019-06-14
Attending: ORTHOPAEDIC SURGERY
Payer: COMMERCIAL

## 2019-06-14 ENCOUNTER — OFFICE VISIT (OUTPATIENT)
Dept: PHYSICAL THERAPY | Age: 59
End: 2019-06-14
Attending: PHYSICIAN ASSISTANT
Payer: COMMERCIAL

## 2019-06-14 PROCEDURE — 97112 NEUROMUSCULAR REEDUCATION: CPT

## 2019-06-14 PROCEDURE — 97110 THERAPEUTIC EXERCISES: CPT

## 2019-06-14 PROCEDURE — 97014 ELECTRIC STIMULATION THERAPY: CPT

## 2019-06-14 PROCEDURE — 97140 MANUAL THERAPY 1/> REGIONS: CPT

## 2019-06-14 NOTE — PROGRESS NOTES
Dx: L knee arthroscopy med menisectomy on 5/30/2019         Authorized # of Visits:  n/a         Next MD visit: none scheduled  Fall Risk: standard         Precautions: n/a           MD PROTOCOL: Closed chain VMO, hip abductor and extensor strengthening.  P Step up fwd/ lat 4\" X 20 Heel raises X 20 Heel raises X 20 Heel raises X 20      Heel raises X 20 Toe raises X 20 Toe raises X 20 Toe raises X 20      Toe raises X 20 Step up fwd/lat 4\" X 20 Step up fwd/lat 4\" X 20 Step up fwd/lat 4\" X 20      minisq

## 2019-06-18 ENCOUNTER — APPOINTMENT (OUTPATIENT)
Dept: PHYSICAL THERAPY | Age: 59
End: 2019-06-18
Attending: ORTHOPAEDIC SURGERY
Payer: COMMERCIAL

## 2019-06-18 ENCOUNTER — OFFICE VISIT (OUTPATIENT)
Dept: PHYSICAL THERAPY | Age: 59
End: 2019-06-18
Attending: INTERNAL MEDICINE
Payer: COMMERCIAL

## 2019-06-18 PROCEDURE — 97110 THERAPEUTIC EXERCISES: CPT | Performed by: PHYSICAL THERAPIST

## 2019-06-18 PROCEDURE — 97140 MANUAL THERAPY 1/> REGIONS: CPT | Performed by: PHYSICAL THERAPIST

## 2019-06-18 PROCEDURE — 97014 ELECTRIC STIMULATION THERAPY: CPT | Performed by: PHYSICAL THERAPIST

## 2019-06-18 NOTE — PROGRESS NOTES
Dx: L knee arthroscopy med menisectomy on 5/30/2019         Authorized # of Visits:  n/a         Next MD visit: none scheduled  Fall Risk: standard         Precautions: n/a           MD PROTOCOL: Closed chain VMO, hip abductor and extensor strengthening.  P Slant board stretch 30 sec X 3 Slant board stretch 30 sec X 3     Step up fwd/ lat 4\" X 20 Heel raises X 20 Heel raises X 20 Heel raises X 20 Heel raises X 20     Heel raises X 20 Toe raises X 20 Toe raises X 20 Toe raises X 20 Toe raises X 20     Toe welch

## 2019-06-20 ENCOUNTER — OFFICE VISIT (OUTPATIENT)
Dept: PHYSICAL THERAPY | Age: 59
End: 2019-06-20
Attending: PHYSICIAN ASSISTANT
Payer: COMMERCIAL

## 2019-06-20 PROCEDURE — 97014 ELECTRIC STIMULATION THERAPY: CPT

## 2019-06-20 PROCEDURE — 97140 MANUAL THERAPY 1/> REGIONS: CPT

## 2019-06-20 PROCEDURE — 97110 THERAPEUTIC EXERCISES: CPT

## 2019-06-20 NOTE — PROGRESS NOTES
Dx: L knee arthroscopy med menisectomy on 5/30/2019         Authorized # of Visits:  n/a         Next MD visit: none scheduled  Fall Risk: standard         Precautions: n/a           MD PROTOCOL: Closed chain VMO, hip abductor and extensor strengthening.  P X 5 mins    Slant board stretch 30 sec X 3 Slant board stretch 30 sec X 3 Slant board stretch 30 sec X 3 Slant board stretch 30 sec X 3 Slant board stretch 30 sec X 3 Slant board stretch 30 sec X 3    Step up fwd/ lat 4\" X 20 Heel raises X 20 Heel raises

## 2019-06-21 ENCOUNTER — APPOINTMENT (OUTPATIENT)
Dept: PHYSICAL THERAPY | Age: 59
End: 2019-06-21
Attending: ORTHOPAEDIC SURGERY
Payer: COMMERCIAL

## 2019-06-25 ENCOUNTER — APPOINTMENT (OUTPATIENT)
Dept: PHYSICAL THERAPY | Age: 59
End: 2019-06-25
Attending: ORTHOPAEDIC SURGERY
Payer: COMMERCIAL

## 2019-06-25 ENCOUNTER — OFFICE VISIT (OUTPATIENT)
Dept: PHYSICAL THERAPY | Age: 59
End: 2019-06-25
Attending: INTERNAL MEDICINE
Payer: COMMERCIAL

## 2019-06-25 PROCEDURE — 97110 THERAPEUTIC EXERCISES: CPT | Performed by: PHYSICAL THERAPIST

## 2019-06-25 PROCEDURE — 97014 ELECTRIC STIMULATION THERAPY: CPT | Performed by: PHYSICAL THERAPIST

## 2019-06-25 PROCEDURE — 97140 MANUAL THERAPY 1/> REGIONS: CPT | Performed by: PHYSICAL THERAPIST

## 2019-06-25 NOTE — PROGRESS NOTES
Dx: L knee arthroscopy med menisectomy on 5/30/2019         Authorized # of Visits:  n/a         Next MD visit: none scheduled  Fall Risk: standard         Precautions: n/a           MD PROTOCOL: Closed chain VMO, hip abductor and extensor strengthening.  P Recumbent bike X 5 mins Recumbent bike X 5 mins Recumbent bike X 5 mins        Slant board stretch 30 sec X 3 Slant board stretch 30 sec X 3 Slant board stretch 30 sec X 3        Heel raises X 20 Heel raises X 20 Heel raises X 20        Toe raises X 20 T

## 2019-06-26 RX ORDER — LEVOTHYROXINE SODIUM 112 UG/1
TABLET ORAL
Qty: 90 TABLET | Refills: 1 | Status: SHIPPED | OUTPATIENT
Start: 2019-06-26 | End: 2019-10-16

## 2019-06-27 ENCOUNTER — OFFICE VISIT (OUTPATIENT)
Dept: PHYSICAL THERAPY | Age: 59
End: 2019-06-27
Attending: FAMILY MEDICINE
Payer: COMMERCIAL

## 2019-06-27 PROCEDURE — 97140 MANUAL THERAPY 1/> REGIONS: CPT | Performed by: PHYSICAL THERAPIST

## 2019-06-27 PROCEDURE — 97014 ELECTRIC STIMULATION THERAPY: CPT | Performed by: PHYSICAL THERAPIST

## 2019-06-27 PROCEDURE — 97110 THERAPEUTIC EXERCISES: CPT | Performed by: PHYSICAL THERAPIST

## 2019-06-27 NOTE — PROGRESS NOTES
Dx: L knee arthroscopy med menisectomy on 5/30/2019         Authorized # of Visits:  n/a         Next MD visit: none scheduled  Fall Risk: standard         Precautions: n/a           MD PROTOCOL: Closed chain VMO, hip abductor and extensor strengthening.  P Date:  TX#: 8/ Date:  TX#: 8/ Date:  TX#: 8/   THERE EX 35   MINS THERE EX 35   MINS THERE EX 35   MINS THERE EX 35   MINS       Recumbent bike X 5 mins Recumbent bike X 5 mins Recumbent bike X 5 mins Recumbent bike X 5 mins       Slant board stretch 30 se

## 2019-06-28 ENCOUNTER — APPOINTMENT (OUTPATIENT)
Dept: PHYSICAL THERAPY | Age: 59
End: 2019-06-28
Attending: ORTHOPAEDIC SURGERY
Payer: COMMERCIAL

## 2019-07-01 ENCOUNTER — OFFICE VISIT (OUTPATIENT)
Dept: PHYSICAL THERAPY | Age: 59
End: 2019-07-01
Attending: FAMILY MEDICINE
Payer: COMMERCIAL

## 2019-07-01 PROCEDURE — 97014 ELECTRIC STIMULATION THERAPY: CPT | Performed by: PHYSICAL THERAPIST

## 2019-07-01 PROCEDURE — 97140 MANUAL THERAPY 1/> REGIONS: CPT | Performed by: PHYSICAL THERAPIST

## 2019-07-01 PROCEDURE — 97110 THERAPEUTIC EXERCISES: CPT | Performed by: PHYSICAL THERAPIST

## 2019-07-01 NOTE — PROGRESS NOTES
Dx: L knee arthroscopy med menisectomy on 5/30/2019         Authorized # of Visits:  n/a         Next MD visit: July 9th 2019  Fall Risk: standard         Precautions: n/a           MD PROTOCOL: Closed chain VMO, hip abductor and extensor strengthening.  Pa Date:  TX#: 8/ Date:  TX#: 8/ Date:  TX#: 8/   THERE EX 35   MINS THERE EX 35   MINS THERE EX 35   MINS THERE EX 35   MINS THERE EX 35   MINS      Recumbent bike X 5 mins Recumbent bike X 5 mins Recumbent bike X 5 mins Recumbent bike X 5 mins Recumbent bik Charges: there ex X 2, manual x1, ES (unattended) X 1      Total Timed Treatment: 45 min  Total Treatment Time: 60 min

## 2019-07-02 ENCOUNTER — APPOINTMENT (OUTPATIENT)
Dept: PHYSICAL THERAPY | Age: 59
End: 2019-07-02
Attending: INTERNAL MEDICINE
Payer: COMMERCIAL

## 2019-07-02 ENCOUNTER — APPOINTMENT (OUTPATIENT)
Dept: PHYSICAL THERAPY | Age: 59
End: 2019-07-02
Attending: ORTHOPAEDIC SURGERY
Payer: COMMERCIAL

## 2019-07-03 ENCOUNTER — OFFICE VISIT (OUTPATIENT)
Dept: PHYSICAL THERAPY | Age: 59
End: 2019-07-03
Attending: FAMILY MEDICINE
Payer: COMMERCIAL

## 2019-07-03 PROCEDURE — 97110 THERAPEUTIC EXERCISES: CPT | Performed by: PHYSICAL THERAPIST

## 2019-07-03 NOTE — PROGRESS NOTES
Dx: L knee arthroscopy med menisectomy on 5/30/2019         Authorized # of Visits:  n/a         Next MD visit: July 9th 2019  Fall Risk: standard         Precautions: n/a           MD PROTOCOL: Closed chain VMO, hip abductor and extensor strengthening.  Pa 7/3/2019  TX#: 9/12 Date:  TX#: 8/ Date:  TX#: 8/   THERE EX 35   MINS THERE EX 35   MINS THERE EX 35   MINS THERE EX 35   MINS THERE EX 35   MINS THERE EX 45   MINS     Recumbent bike X 5 mins Recumbent bike X 5 mins Recumbent bike X 5 mins Recumbent bike CP to L knee X 15 mins IFC with CP to L knee X 15 mins FC with CP to L knee X 15 mins Patellar mobs with knee ext stretch Gr III          ES (UNATTENDED) 15 MINS           IFC with CP to L knee X 15 mins                                          Charges: th

## 2019-07-05 ENCOUNTER — APPOINTMENT (OUTPATIENT)
Dept: PHYSICAL THERAPY | Age: 59
End: 2019-07-05
Attending: ORTHOPAEDIC SURGERY
Payer: COMMERCIAL

## 2019-07-08 ENCOUNTER — OFFICE VISIT (OUTPATIENT)
Dept: PHYSICAL THERAPY | Age: 59
End: 2019-07-08
Attending: FAMILY MEDICINE
Payer: COMMERCIAL

## 2019-07-08 PROCEDURE — 97110 THERAPEUTIC EXERCISES: CPT

## 2019-07-08 NOTE — PROGRESS NOTES
PROGRESS NOTE    Dx: L knee arthroscopy med menisectomy on 5/30/2019         Authorized # of Visits:  n/a         Next MD visit: July 9th 2019  Fall Risk: standard         Precautions: n/a           MD PROTOCOL: Closed chain VMO, hip abductor and extensor 6/25/2019  TX#: 8/12 Date: 6/27/2019  TX#: 9/12 Date: 7/1/2019  TX#: 8/12 Date: 7/3/2019  TX#: 9/12 Date:  TX#: 8/ Date:  TX#: 8/   THERE EX 35   MINS THERE EX 35   MINS THERE EX 35   MINS THERE EX 35   MINS THERE EX 35   MINS THERE EX 45   MINS     Recumb Patellar and tibiofemoral mobs ant/ post with end range stretching  TRX minisquats X 20      ES (UNATTENDED) 15 MINS ES (UNATTENDED) 15 MINS ES (UNATTENDED) 15 MINS ES (UNATTENDED) 15 MINS MAN THERE 10 MINS      IFC with CP to L knee X 15 mins FC with CP t

## 2019-07-18 ENCOUNTER — APPOINTMENT (OUTPATIENT)
Dept: PHYSICAL THERAPY | Age: 59
End: 2019-07-18
Attending: INTERNAL MEDICINE
Payer: COMMERCIAL

## 2019-07-23 ENCOUNTER — APPOINTMENT (OUTPATIENT)
Dept: PHYSICAL THERAPY | Age: 59
End: 2019-07-23
Attending: INTERNAL MEDICINE
Payer: COMMERCIAL

## 2019-07-25 ENCOUNTER — APPOINTMENT (OUTPATIENT)
Dept: PHYSICAL THERAPY | Age: 59
End: 2019-07-25
Attending: INTERNAL MEDICINE
Payer: COMMERCIAL

## 2019-10-16 ENCOUNTER — OFFICE VISIT (OUTPATIENT)
Dept: INTERNAL MEDICINE CLINIC | Facility: CLINIC | Age: 59
End: 2019-10-16
Payer: COMMERCIAL

## 2019-10-16 VITALS
WEIGHT: 160 LBS | SYSTOLIC BLOOD PRESSURE: 118 MMHG | DIASTOLIC BLOOD PRESSURE: 80 MMHG | HEIGHT: 66 IN | OXYGEN SATURATION: 98 % | HEART RATE: 78 BPM | TEMPERATURE: 98 F | BODY MASS INDEX: 25.71 KG/M2 | RESPIRATION RATE: 16 BRPM

## 2019-10-16 DIAGNOSIS — R43.9 SMELL DISTURBANCE: ICD-10-CM

## 2019-10-16 DIAGNOSIS — H83.2X9 VESTIBULAR DYSFUNCTION, UNSPECIFIED LATERALITY: ICD-10-CM

## 2019-10-16 DIAGNOSIS — I10 ESSENTIAL HYPERTENSION, BENIGN: Primary | ICD-10-CM

## 2019-10-16 DIAGNOSIS — E78.00 PURE HYPERCHOLESTEROLEMIA: ICD-10-CM

## 2019-10-16 PROCEDURE — 90471 IMMUNIZATION ADMIN: CPT | Performed by: FAMILY MEDICINE

## 2019-10-16 PROCEDURE — 90686 IIV4 VACC NO PRSV 0.5 ML IM: CPT | Performed by: FAMILY MEDICINE

## 2019-10-16 PROCEDURE — 99214 OFFICE O/P EST MOD 30 MIN: CPT | Performed by: FAMILY MEDICINE

## 2019-10-16 RX ORDER — LEVOTHYROXINE SODIUM 112 UG/1
TABLET ORAL
Qty: 90 TABLET | Refills: 1 | Status: SHIPPED | OUTPATIENT
Start: 2019-10-16 | End: 2020-04-15

## 2019-10-16 RX ORDER — HYDROCHLOROTHIAZIDE 12.5 MG/1
12.5 TABLET ORAL
Refills: 3 | COMMUNITY
Start: 2019-10-02 | End: 2020-04-14

## 2019-10-16 RX ORDER — IRBESARTAN 300 MG/1
300 TABLET ORAL
Refills: 3 | COMMUNITY
Start: 2019-10-02 | End: 2020-04-14

## 2019-10-16 NOTE — PROGRESS NOTES
HPI:    Patient ID: Elizabeth Mesa is a 62year old female. HPI  Elizabeth Mesa is a 62year old female. HPI:   Pt is here for med ck/follow up. Overall is doing well. Is taking meds as directed.   No issues w medications      Has had issues w her smell standard drinks      Comment: rare    Drug use: No       REVIEW OF SYSTEMS:   GENERAL HEALTH: feels well otherwise  SKIN: denies rashes  RESPIRATORY: denies shortness of breath   CARDIOVASCULAR: denies chest pain on exertion  GI: denies abdominal pain  CHEVY Sodium 112 MCG Oral Tab, Take 1 tablet (112 mcg total) by mouth once daily. , Disp: 90 tablet, Rfl: 1  aspirin 81 MG Oral Tab, Take 81 mg by mouth daily. , Disp: , Rfl:   Multiple Vitamins-Minerals (MULTIVITAL) Oral Tab, Take 1 Tab by mouth daily. , Disp: , R

## 2019-10-24 ENCOUNTER — OFFICE VISIT (OUTPATIENT)
Dept: PHYSICAL THERAPY | Age: 59
End: 2019-10-24
Attending: FAMILY MEDICINE
Payer: COMMERCIAL

## 2019-10-24 DIAGNOSIS — H83.2X9 VESTIBULAR DYSFUNCTION, UNSPECIFIED LATERALITY: ICD-10-CM

## 2019-10-24 PROCEDURE — 97110 THERAPEUTIC EXERCISES: CPT

## 2019-10-24 PROCEDURE — 97162 PT EVAL MOD COMPLEX 30 MIN: CPT

## 2019-10-24 PROCEDURE — 95992 CANALITH REPOSITIONING PROC: CPT

## 2019-10-24 NOTE — PROGRESS NOTES
VESTIBULAR EVALUATION:   Referring Physician: Dr. Mary Martinez  Diagnosis: Vestibular dysfunction, unspecified laterality (F41.2V3) Date of Service: 10/24/2019     PATIENT SUMMARY   Jelena Chatman is a 62year old female who presents to therapy today with report knee meniscectomy 5/30/2019, L foot surgery 9/2016, hysterectomy 02/2011, HTN, high cholesterol    ASSESSMENT  Fariba Hooks presents to physical therapy evaluation with primary c/o spinning sensation.  The results of the objective tests and measures show Renetta-Nuñez possible dizziness after evaluation. Patient was treated with right canalith repositioning for posterior canal BPPV with good tolerance and resolution of nystagmus.   Patient was instructed in and issued a HEP for: VOR x 1 vertical (seated), VOR x 1 hori [phone]    Sincerely,  Electronically signed by therapist: Tor Sheppard, PT  [de-identified] certification required: Yes  I certify the need for these services furnished under this plan of treatment and while under my care.     X______________________________

## 2019-10-25 ENCOUNTER — TELEPHONE (OUTPATIENT)
Dept: INTERNAL MEDICINE CLINIC | Facility: CLINIC | Age: 59
End: 2019-10-25

## 2019-10-25 NOTE — TELEPHONE ENCOUNTER
Patient calling to verify if [facility] has authorized her MRI of brain; looked in system and \"referral\" shows authorized; please verify if there is additional authorization for procedure and call patient back to inform

## 2019-10-28 ENCOUNTER — OFFICE VISIT (OUTPATIENT)
Dept: PHYSICAL THERAPY | Age: 59
End: 2019-10-28
Attending: FAMILY MEDICINE
Payer: COMMERCIAL

## 2019-10-28 DIAGNOSIS — H83.2X9 VESTIBULAR DYSFUNCTION, UNSPECIFIED LATERALITY: ICD-10-CM

## 2019-10-28 PROCEDURE — 97110 THERAPEUTIC EXERCISES: CPT

## 2019-10-28 PROCEDURE — 95992 CANALITH REPOSITIONING PROC: CPT

## 2019-10-28 NOTE — PROGRESS NOTES
Dx: Vestibular dysfunction, unspecified laterality (H80.3X9)       Insurance (Authorized # of Visits):  6          Authorizing Physician: Dr. Kristina Johnson  Next MD visit: none scheduled  Fall Risk: standard         Precautions: n/a             Subjective: She sta Date:   Tx#: 6/   CRM for R posterior canal BPPV       TE  Corner tandem stance  Corner Sharpened Romberg   Single leg balance R & L x 4 trials each  Tandem gait in // bars x 4 lengths  Gait with vertical head turns in // bars x 4 lengths  Gait with horizo

## 2019-10-30 ENCOUNTER — OFFICE VISIT (OUTPATIENT)
Dept: PHYSICAL THERAPY | Age: 59
End: 2019-10-30
Attending: FAMILY MEDICINE
Payer: COMMERCIAL

## 2019-10-30 DIAGNOSIS — H83.2X9 VESTIBULAR DYSFUNCTION, UNSPECIFIED LATERALITY: ICD-10-CM

## 2019-10-30 PROCEDURE — 95992 CANALITH REPOSITIONING PROC: CPT

## 2019-10-30 PROCEDURE — 97112 NEUROMUSCULAR REEDUCATION: CPT

## 2019-10-30 NOTE — PROGRESS NOTES
Dx: Vestibular dysfunction, unspecified laterality (H80.3X9)       Insurance (Authorized # of Visits):  6          Authorizing Physician: Dr. Pietro Mata  Next MD visit: none scheduled  Fall Risk: standard         Precautions: n/a             Subjective: She sta also given a printed handout.       TE  Corner tandem stance  Corner Sharpened Romberg   Single leg balance R & L x 4 trials each  Tandem gait in // bars x 4 lengths  Gait with vertical head turns in // bars x 4 lengths  Gait with horizontal head turns in /

## 2019-11-04 ENCOUNTER — TELEPHONE (OUTPATIENT)
Dept: INTERNAL MEDICINE CLINIC | Facility: CLINIC | Age: 59
End: 2019-11-04

## 2019-11-04 DIAGNOSIS — R93.89 ABNORMAL MRI: ICD-10-CM

## 2019-11-04 DIAGNOSIS — R43.9 SMELL DISTURBANCE: Primary | ICD-10-CM

## 2019-11-04 NOTE — TELEPHONE ENCOUNTER
Patient called in to request a new prescription for her  Vertigo. Patient stated she will be leaving on a trip this Thursday 11/07/19 and returning 11/10/19 and would like this medication for the flight. Please call back if approved.

## 2019-11-05 ENCOUNTER — HOSPITAL ENCOUNTER (OUTPATIENT)
Dept: MRI IMAGING | Facility: HOSPITAL | Age: 59
Discharge: HOME | End: 2019-11-05
Attending: FAMILY MEDICINE
Payer: COMMERCIAL

## 2019-11-05 DIAGNOSIS — H83.2X9 VESTIBULAR DYSFUNCTION, UNSPECIFIED LATERALITY: ICD-10-CM

## 2019-11-05 DIAGNOSIS — R43.9 SMELL DISTURBANCE: ICD-10-CM

## 2019-11-05 PROCEDURE — 70551 MRI BRAIN STEM W/O DYE: CPT | Performed by: FAMILY MEDICINE

## 2019-11-05 NOTE — TELEPHONE ENCOUNTER
Please call patient and have her make an appointment to see doctor for Vertigo. No hx of vertigo medications prescribed.

## 2019-11-05 NOTE — TELEPHONE ENCOUNTER
Pt called to get medication for vertigo pcp said he would fill it but pt dose not know the name of medication but will call so we can request it from pharmacy

## 2019-11-06 ENCOUNTER — OFFICE VISIT (OUTPATIENT)
Dept: PHYSICAL THERAPY | Age: 59
End: 2019-11-06
Attending: FAMILY MEDICINE
Payer: COMMERCIAL

## 2019-11-06 DIAGNOSIS — H83.2X9 VESTIBULAR DYSFUNCTION, UNSPECIFIED LATERALITY: ICD-10-CM

## 2019-11-06 PROCEDURE — 97112 NEUROMUSCULAR REEDUCATION: CPT

## 2019-11-06 RX ORDER — MECLIZINE HYDROCHLORIDE 25 MG/1
25 TABLET ORAL 3 TIMES DAILY PRN
Qty: 30 TABLET | Refills: 0 | Status: SHIPPED | OUTPATIENT
Start: 2019-11-06 | End: 2020-08-27

## 2019-11-06 NOTE — TELEPHONE ENCOUNTER
----- Message from Cassandra Mcdonough MD sent at 11/6/2019 10:32 AM CST -----  Along the ethmoid sinus they describe possblu infectiuos process irritating a new that deals w her sense of smell  They rec CT sinus to further evaluate

## 2019-11-06 NOTE — PROGRESS NOTES
Dx: Vestibular dysfunction, unspecified laterality (H80.3X9)       Insurance (Authorized # of Visits):  6          Authorizing Physician: Dr. Pepper Mancilla  Next MD visit: none scheduled  Fall Risk: standard         Precautions: n/a             Subjective: She sta each  Tandem gait in // bars x 4 lengths  Gait with vertical head turns in // bars x 4 lengths  Gait with horizontal head turns in // bars x 4 lengths  CP x 10 min cervical area NR  Sharpened Romberg EO and EC x 2 trials  Single leg balance R & LLE ball to

## 2019-11-07 ENCOUNTER — APPOINTMENT (OUTPATIENT)
Dept: PHYSICAL THERAPY | Age: 59
End: 2019-11-07
Attending: FAMILY MEDICINE
Payer: COMMERCIAL

## 2019-11-13 ENCOUNTER — HOSPITAL ENCOUNTER (OUTPATIENT)
Dept: CT IMAGING | Facility: HOSPITAL | Age: 59
Discharge: HOME | End: 2019-11-13
Attending: FAMILY MEDICINE
Payer: COMMERCIAL

## 2019-11-13 DIAGNOSIS — R43.9 SMELL DISTURBANCE: ICD-10-CM

## 2019-11-13 DIAGNOSIS — R93.89 ABNORMAL MRI: ICD-10-CM

## 2019-11-13 PROCEDURE — 70486 CT MAXILLOFACIAL W/O DYE: CPT | Performed by: FAMILY MEDICINE

## 2019-11-15 ENCOUNTER — TELEPHONE (OUTPATIENT)
Dept: INTERNAL MEDICINE CLINIC | Facility: CLINIC | Age: 59
End: 2019-11-15

## 2019-11-15 DIAGNOSIS — R43.9 DISTURBANCES OF SENSATION OF SMELL AND TASTE: Primary | ICD-10-CM

## 2019-12-04 ENCOUNTER — OFFICE VISIT (OUTPATIENT)
Dept: PHYSICAL THERAPY | Age: 59
End: 2019-12-04
Attending: FAMILY MEDICINE
Payer: COMMERCIAL

## 2019-12-04 PROCEDURE — 97112 NEUROMUSCULAR REEDUCATION: CPT

## 2019-12-04 NOTE — PROGRESS NOTES
Dx: Vestibular dysfunction, unspecified laterality (H80.3X9)       Insurance (Authorized # of Visits):  6          Authorizing Physician: Dr. Rosalia Lee  Next MD visit: none scheduled  Fall Risk: standard         Precautions: n/a            Discharge Summary  P time.      Patient/Family/Caregiver was advised of these findings, precautions, and treatment options and has agreed to actively participate in planning and for this course of care.     Thank you for your referral. If you have any questions, please contact laps (30 ft each)  Gait with horizontal head turns x 2 laps (30 ft each)  Standing on airex reach for cones diagonal patterns x 2 min  Balance board AP balancing x 1 min  Balance board ML balancing x 1 min   NR  Sharpened Romberg EO and EC x 2 trials  [employer]

## 2019-12-11 ENCOUNTER — APPOINTMENT (OUTPATIENT)
Dept: PHYSICAL THERAPY | Age: 59
End: 2019-12-11
Attending: FAMILY MEDICINE
Payer: COMMERCIAL

## 2020-04-14 NOTE — TELEPHONE ENCOUNTER
Pravastatin Sodium 40 MG   Irbesartan 300 MG   Levothyroxine Sodium 112 MCG   hydrochlorothiazide 12.5 MG   [place], [place] - [address] [place] [address], [phone]

## 2020-04-15 RX ORDER — HYDROCHLOROTHIAZIDE 12.5 MG/1
12.5 TABLET ORAL
Qty: 90 TABLET | Refills: 0 | Status: SHIPPED | OUTPATIENT
Start: 2020-04-15 | End: 2020-06-22

## 2020-04-15 RX ORDER — LEVOTHYROXINE SODIUM 112 UG/1
TABLET ORAL
Qty: 90 TABLET | Refills: 0 | Status: SHIPPED | OUTPATIENT
Start: 2020-04-15 | End: 2020-06-22

## 2020-04-15 RX ORDER — PRAVASTATIN SODIUM 40 MG
40 TABLET ORAL
Qty: 90 TABLET | Refills: 0 | Status: SHIPPED | OUTPATIENT
Start: 2020-04-15 | End: 2020-06-22

## 2020-04-15 RX ORDER — IRBESARTAN 300 MG/1
300 TABLET ORAL
Qty: 90 TABLET | Refills: 0 | Status: SHIPPED | OUTPATIENT
Start: 2020-04-15 | End: 2020-06-22

## 2020-04-15 NOTE — TELEPHONE ENCOUNTER
Levothyroxine 112 mcg failed protocol due to  Thyroid Supplements Protocol Failed4/14 4:34 PM   TSH test in past 12 months    TSH value between 0.350 and 5.500 IU/ml   Last OV relevant to medication: 4-3-19  Last refill date: 9-10-18 #/refills: 1  When pt

## 2020-06-12 ENCOUNTER — HOSPITAL ENCOUNTER (OUTPATIENT)
Dept: CT IMAGING | Facility: HOSPITAL | Age: 60
Discharge: HOME | End: 2020-06-12
Attending: PHYSICIAN ASSISTANT
Payer: COMMERCIAL

## 2020-06-12 DIAGNOSIS — M17.12 ARTHRITIS OF KNEE, LEFT: ICD-10-CM

## 2020-06-12 DIAGNOSIS — M25.562 LEFT KNEE PAIN: ICD-10-CM

## 2020-06-12 PROCEDURE — 73700 CT LOWER EXTREMITY W/O DYE: CPT | Performed by: PHYSICIAN ASSISTANT

## 2020-06-22 ENCOUNTER — OFFICE VISIT (OUTPATIENT)
Dept: INTERNAL MEDICINE CLINIC | Facility: CLINIC | Age: 60
End: 2020-06-22
Payer: COMMERCIAL

## 2020-06-22 VITALS
RESPIRATION RATE: 16 BRPM | BODY MASS INDEX: 24.43 KG/M2 | WEIGHT: 152 LBS | SYSTOLIC BLOOD PRESSURE: 128 MMHG | DIASTOLIC BLOOD PRESSURE: 80 MMHG | HEART RATE: 78 BPM | HEIGHT: 66 IN

## 2020-06-22 DIAGNOSIS — Z12.31 VISIT FOR SCREENING MAMMOGRAM: ICD-10-CM

## 2020-06-22 DIAGNOSIS — I10 ESSENTIAL HYPERTENSION, BENIGN: ICD-10-CM

## 2020-06-22 DIAGNOSIS — R68.81 EARLY SATIETY: ICD-10-CM

## 2020-06-22 DIAGNOSIS — E55.9 VITAMIN D DEFICIENCY: ICD-10-CM

## 2020-06-22 DIAGNOSIS — Z00.00 WELLNESS EXAMINATION: Primary | ICD-10-CM

## 2020-06-22 DIAGNOSIS — E03.9 HYPOTHYROIDISM, UNSPECIFIED TYPE: ICD-10-CM

## 2020-06-22 DIAGNOSIS — Z00.00 LABORATORY EXAM ORDERED AS PART OF ROUTINE GENERAL MEDICAL EXAMINATION: ICD-10-CM

## 2020-06-22 PROCEDURE — 99213 OFFICE O/P EST LOW 20 MIN: CPT | Performed by: FAMILY MEDICINE

## 2020-06-22 PROCEDURE — 99396 PREV VISIT EST AGE 40-64: CPT | Performed by: FAMILY MEDICINE

## 2020-06-22 RX ORDER — PRAVASTATIN SODIUM 40 MG
40 TABLET ORAL
Qty: 90 TABLET | Refills: 0 | Status: SHIPPED | OUTPATIENT
Start: 2020-06-22 | End: 2020-12-19

## 2020-06-22 RX ORDER — LEVOTHYROXINE SODIUM 112 UG/1
112 TABLET ORAL
Qty: 90 TABLET | Refills: 1 | Status: SHIPPED | OUTPATIENT
Start: 2020-06-22 | End: 2020-12-19

## 2020-06-22 RX ORDER — HYDROCHLOROTHIAZIDE 12.5 MG/1
12.5 TABLET ORAL
Qty: 90 TABLET | Refills: 1 | Status: SHIPPED | OUTPATIENT
Start: 2020-06-22 | End: 2020-12-19

## 2020-06-22 RX ORDER — IRBESARTAN 300 MG/1
300 TABLET ORAL
Qty: 90 TABLET | Refills: 1 | Status: SHIPPED | OUTPATIENT
Start: 2020-06-22 | End: 2020-12-19

## 2020-06-22 RX ORDER — FLUTICASONE PROPIONATE 50 MCG
SPRAY, SUSPENSION (ML) NASAL
COMMUNITY
Start: 2020-05-17

## 2020-06-22 NOTE — PROGRESS NOTES
HPI:   Bela Bill is a 62year old female who presents for a complete physical exam.     Wt Readings from Last 6 Encounters:  06/22/20 : 152 lb (68.9 kg)  10/16/19 : 160 lb (72.6 kg)  05/20/19 : 167 lb (75.8 kg)  04/03/19 : 163 lb 8 oz (74.2 kg)  01/09/1 Fluticasone Propionate 50 MCG/ACT Nasal Suspension      • Meclizine HCl 25 MG Oral Tab Take 1 tablet (25 mg total) by mouth 3 (three) times daily as needed.  (Patient not taking: Reported on 6/22/2020 ) 30 tablet 0      Past Medical History:   Diagnosis Denys otherwise   Has lost weight  Not really trying    SKIN: denies rash  HEENT: denies nasal congestion, sinus pain or ST  LUNGS: denies shortness of breath  No cough  CARDIOVASCULAR: denies chest pain on exertion  No palpitations    GI: denies abdominal pain

## 2020-06-24 ENCOUNTER — HOSPITAL ENCOUNTER (OUTPATIENT)
Dept: MAMMOGRAPHY | Age: 60
Discharge: HOME | End: 2020-06-24
Attending: FAMILY MEDICINE
Payer: COMMERCIAL

## 2020-06-24 DIAGNOSIS — Z12.31 VISIT FOR SCREENING MAMMOGRAM: ICD-10-CM

## 2020-06-24 PROCEDURE — 77063 BREAST TOMOSYNTHESIS BI: CPT | Performed by: FAMILY MEDICINE

## 2020-06-24 PROCEDURE — 77067 SCR MAMMO BI INCL CAD: CPT | Performed by: FAMILY MEDICINE

## 2020-06-26 ENCOUNTER — HOSPITAL ENCOUNTER (OUTPATIENT)
Dept: GENERAL RADIOLOGY | Facility: HOSPITAL | Age: 60
Discharge: HOME | End: 2020-06-26
Attending: FAMILY MEDICINE
Payer: COMMERCIAL

## 2020-06-26 DIAGNOSIS — R68.81 EARLY SATIETY: ICD-10-CM

## 2020-06-26 PROCEDURE — 74246 X-RAY XM UPR GI TRC 2CNTRST: CPT | Performed by: FAMILY MEDICINE

## 2020-08-27 ENCOUNTER — OFFICE VISIT (OUTPATIENT)
Dept: INTERNAL MEDICINE CLINIC | Facility: CLINIC | Age: 60
End: 2020-08-27
Payer: COMMERCIAL

## 2020-08-27 VITALS
RESPIRATION RATE: 16 BRPM | TEMPERATURE: 98 F | HEART RATE: 73 BPM | HEIGHT: 66 IN | BODY MASS INDEX: 24.17 KG/M2 | WEIGHT: 150.38 LBS | OXYGEN SATURATION: 98 % | DIASTOLIC BLOOD PRESSURE: 80 MMHG | SYSTOLIC BLOOD PRESSURE: 117 MMHG

## 2020-08-27 DIAGNOSIS — Z01.818 PREOP EXAMINATION: Primary | ICD-10-CM

## 2020-08-27 DIAGNOSIS — Z01.818 PRE-OP EXAMINATION: ICD-10-CM

## 2020-08-27 DIAGNOSIS — E03.9 HYPOTHYROIDISM, UNSPECIFIED TYPE: ICD-10-CM

## 2020-08-27 DIAGNOSIS — I10 ESSENTIAL HYPERTENSION, BENIGN: ICD-10-CM

## 2020-08-27 PROCEDURE — 99213 OFFICE O/P EST LOW 20 MIN: CPT | Performed by: FAMILY MEDICINE

## 2020-08-27 PROCEDURE — 3008F BODY MASS INDEX DOCD: CPT | Performed by: FAMILY MEDICINE

## 2020-08-27 PROCEDURE — 3074F SYST BP LT 130 MM HG: CPT | Performed by: FAMILY MEDICINE

## 2020-08-27 PROCEDURE — 3079F DIAST BP 80-89 MM HG: CPT | Performed by: FAMILY MEDICINE

## 2020-08-27 NOTE — PROGRESS NOTES
Beni Mcdaniel is a 62year old female.   HPI:   Here for a preop exam for her upcoming under the care of Dr La Nena Martinez    Date is set for 9/18/2020   Location is at [facility] appt for preop testing for labs and EKG    General anesthesia to be used in joints    • Pure hypercholesterolemia 1/19/2013   • Rash    • Wears glasses      Past Surgical History:   Procedure Laterality Date   • COLONOSCOPY  12/2008    repeat in 10 years   • HYSTERECTOMY  2010    total hystero   • LAPAROSCOPY,DIAGNOSTIC     • O by mouth once daily. 90 tablet 1   • Pravastatin Sodium 40 MG Oral Tab Take 1 tablet (40 mg total) by mouth once daily. 90 tablet 0   • Levothyroxine Sodium 112 MCG Oral Tab Take 1 tablet (112 mcg total) by mouth once daily.  90 tablet 1   • triamcinolone a

## 2020-09-01 ENCOUNTER — TELEPHONE (OUTPATIENT)
Dept: INTERNAL MEDICINE CLINIC | Facility: CLINIC | Age: 60
End: 2020-09-01

## 2020-09-01 NOTE — TELEPHONE ENCOUNTER
Received labs done on 9/1/2020 and EKG done on 9/1/2020. Placed in Dr Kennard Najjar for review. Preop OV note from 8/27/2020 and results need to be faxed to [phone].

## 2020-10-05 ENCOUNTER — OFFICE VISIT (OUTPATIENT)
Dept: PHYSICAL THERAPY | Age: 60
End: 2020-10-05
Attending: PHYSICIAN ASSISTANT
Payer: COMMERCIAL

## 2020-10-05 ENCOUNTER — ORDER TRANSCRIPTION (OUTPATIENT)
Dept: PHYSICAL THERAPY | Age: 60
End: 2020-10-05

## 2020-10-05 DIAGNOSIS — Z96.652 PRESENCE OF LEFT ARTIFICIAL KNEE JOINT: ICD-10-CM

## 2020-10-05 DIAGNOSIS — Z47.1 AFTERCARE FOLLOWING JOINT REPLACEMENT: Primary | ICD-10-CM

## 2020-10-05 PROCEDURE — 97161 PT EVAL LOW COMPLEX 20 MIN: CPT

## 2020-10-05 PROCEDURE — 97110 THERAPEUTIC EXERCISES: CPT

## 2020-10-06 NOTE — PROGRESS NOTES
POST-OP KNEE EVALUATION:   Referring Physician: Dr. Yana Marino  Diagnosis: Gladis Mishra   Date of Service: 10/5/2020      PATIENT SUMMARY   Jelena Chatman is a 56year old female who presents to therapy today s/p left TKA on 9/18/20 with complaints of achy/sharp media quad/hamstring weakness. She shows decreased light touch sensation at her left L4 and S2 dermatomes when compared to her contralateral side. [place] is tender to palpation is at left medial knee joint line, MCL, adductors, and quad musculature.  She shows no       Flexibility:   Hamstrings: R; mild L; mild  Gastroc-soleus: R; mild L; mild  Piriformis: R; mild L; mild  Quads: R; mod L; significant     Strength/MMT:   Hip Knee   Flexion: R 4/5; L 4/5  Extension: R 4-/5; L 4-/5  Abduction: R 4/5; L 4/5  ER: R 4 and down from the floor safely  · Pt will get in and our of her car with < or = to VAS 1/10 left knee pain for one consecutive week  · Pt will improve her 30 sec STS test score to 13 reps to improve her transfer efficiency  · Pt will demonstrate increased

## 2020-10-06 NOTE — PROGRESS NOTES
POST-OP KNEE EVALUATION:   Referring Physician: Dr. Gaviota Miles  Diagnosis: Florina Sung   Date of Service: 10/5/2020      PATIENT SUMMARY   Yoan Joseph is a 56year old female who presents to therapy today s/p left TKA on 9/18/20 with complaints of achy/sharp media quad/hamstring weakness. She shows decreased light touch sensation at her left L4 and S2 dermatomes when compared to her contralateral side. Rilla Chiki is tender to palpation is at left medial knee joint line, MCL, adductors, and quad musculature.  She shows no       Flexibility:   Hamstrings: R; mild L; mild  Gastroc-soleus: R; mild L; mild  Piriformis: R; mild L; mild  Quads: R; mod L; significant     Strength/MMT:   Hip Knee   Flexion: R 4/5; L 4/5  Extension: R 4-/5; L 4-/5  Abduction: R 4/5; L 4/5  ER: R 4 and down from the floor safely  · Pt will get in and our of her car with < or = to VAS 1/10 left knee pain for one consecutive week  · Pt will improve her 30 sec STS test score to 13 reps to improve her transfer efficiency  · Pt will demonstrate increased

## 2020-10-07 ENCOUNTER — OFFICE VISIT (OUTPATIENT)
Dept: PHYSICAL THERAPY | Age: 60
End: 2020-10-07
Attending: PHYSICIAN ASSISTANT
Payer: COMMERCIAL

## 2020-10-07 PROCEDURE — 97110 THERAPEUTIC EXERCISES: CPT

## 2020-10-07 PROCEDURE — 97140 MANUAL THERAPY 1/> REGIONS: CPT

## 2020-10-07 NOTE — PROGRESS NOTES
Dx: L TKA         Insurance (Authorized # of Visits):  15           Authorizing Physician: Dr. Randy De Santiago MD visit: none scheduled  Fall Risk: standard         Precautions: n/a             Subjective:  The patient comes into therapy with VAS 2/10 left knee to 13 reps to improve her transfer efficiency  · Pt will demonstrate increased hip ER/ABD strength to 4+/5 to perform stepping and squatting activities without excessive femoral IR/ADD  · Pt will be independent and compliant with comprehensive HEP to maint

## 2020-10-09 ENCOUNTER — APPOINTMENT (OUTPATIENT)
Dept: PHYSICAL THERAPY | Age: 60
End: 2020-10-09
Attending: PHYSICIAN ASSISTANT
Payer: COMMERCIAL

## 2020-10-09 ENCOUNTER — OFFICE VISIT (OUTPATIENT)
Dept: PHYSICAL THERAPY | Age: 60
End: 2020-10-09
Attending: PHYSICIAN ASSISTANT
Payer: COMMERCIAL

## 2020-10-09 PROCEDURE — 97110 THERAPEUTIC EXERCISES: CPT

## 2020-10-09 PROCEDURE — 97140 MANUAL THERAPY 1/> REGIONS: CPT

## 2020-10-09 NOTE — PROGRESS NOTES
Dx: L TKA         Insurance (Authorized # of Visits):  15           Authorizing Physician: Dr. Oscar Anand  Next MD visit: none scheduled  Fall Risk: standard         Precautions: n/a             Subjective:  The patient comes into therapy with VAS 1/10 left knee will get in and our of her car with < or = to VAS 1/10 left knee pain for one consecutive week  · Pt will improve her 30 sec STS test score to 13 reps to improve her transfer efficiency  · Pt will demonstrate increased hip ER/ABD strength to 4+/5 to perfor

## 2020-10-12 ENCOUNTER — OFFICE VISIT (OUTPATIENT)
Dept: PHYSICAL THERAPY | Age: 60
End: 2020-10-12
Attending: PHYSICIAN ASSISTANT
Payer: COMMERCIAL

## 2020-10-12 PROCEDURE — 97140 MANUAL THERAPY 1/> REGIONS: CPT

## 2020-10-12 PROCEDURE — 97110 THERAPEUTIC EXERCISES: CPT

## 2020-10-12 NOTE — PROGRESS NOTES
Dx: L TKA         Insurance (Authorized # of Visits):  15           Authorizing Physician: Dr. Beatrice De Santiago MD visit: none scheduled  Fall Risk: standard         Precautions: n/a             Subjective:  The patient comes into therapy with VAS 2/10 left knee efficiency  · Pt will demonstrate increased hip ER/ABD strength to 4+/5 to perform stepping and squatting activities without excessive femoral IR/ADD  · Pt will be independent and compliant with comprehensive HEP to maintain progress achieved in PT      Pl - 10 mins                     Charges:  Therex x 2, MT x 1       Total Timed Treatment: 45 min  Total Treatment Time: 55 min

## 2020-10-14 ENCOUNTER — OFFICE VISIT (OUTPATIENT)
Dept: PHYSICAL THERAPY | Age: 60
End: 2020-10-14
Attending: PHYSICIAN ASSISTANT
Payer: COMMERCIAL

## 2020-10-14 PROCEDURE — 97140 MANUAL THERAPY 1/> REGIONS: CPT

## 2020-10-14 PROCEDURE — 97110 THERAPEUTIC EXERCISES: CPT

## 2020-10-14 NOTE — PROGRESS NOTES
Dx: L TKA         Insurance (Authorized # of Visits):  15           Authorizing Physician: Dr. Karina Rodriguez  Next MD visit: none scheduled  Fall Risk: standard         Precautions: n/a             Subjective:  The patient comes into therapy with VAS 3/10 left knee be independent and compliant with comprehensive HEP to maintain progress achieved in PT    Plan: POC will emphasize anterior/posterior TF glides, PROM/AROM, STM, flexibility, LE open/close chain strengthening, and functional activties  Date: 10/7/2020  TX# holds TKE's w/ GB, x 10 reps, 2 sets, w/ 3 sec holds TKE's w/ GB, x 10 reps, 2 sets, w/ 3 sec holds TKE's w/ GB, x 10 reps, 2 sets, w/ 3 sec holds    LBW/MW w/ GB - x 20 feet - 4 sets each LBW/MW w/ GB - x 20 feet - 4 sets each LBW/MW w/ GB - x 20 feet - 4

## 2020-10-16 ENCOUNTER — OFFICE VISIT (OUTPATIENT)
Dept: PHYSICAL THERAPY | Age: 60
End: 2020-10-16
Attending: PHYSICIAN ASSISTANT
Payer: COMMERCIAL

## 2020-10-16 PROCEDURE — 97110 THERAPEUTIC EXERCISES: CPT

## 2020-10-16 PROCEDURE — 97140 MANUAL THERAPY 1/> REGIONS: CPT

## 2020-10-16 NOTE — PROGRESS NOTES
Dx: L TKA         Insurance (Authorized # of Visits):  15           Authorizing Physician: Dr. Brianna Ventura  Next MD visit: none scheduled  Fall Risk: standard         Precautions: n/a             Subjective:  The patient comes into therapy with VAS 2/10 left knee efficiency  · Pt will demonstrate increased hip ER/ABD strength to 4+/5 to perform stepping and squatting activities without excessive femoral IR/ADD  · Pt will be independent and compliant with comprehensive HEP to maintain progress achieved in PT      Pl feet, 4 sets L knee extension push off on stool, x 30 feet, 4 sets L knee extension push off on stool, x 30 feet, 4 sets     4\" heel taps, x 10 reps, 2 sets 4\" heel taps, x 10 reps, 2 sets   TKE's w/ GB, x 10 reps, 2 sets, w/ 3 sec holds TKE's w/ GB, x 1

## 2020-10-19 ENCOUNTER — OFFICE VISIT (OUTPATIENT)
Dept: PHYSICAL THERAPY | Age: 60
End: 2020-10-19
Attending: PHYSICIAN ASSISTANT
Payer: COMMERCIAL

## 2020-10-19 PROCEDURE — 97110 THERAPEUTIC EXERCISES: CPT

## 2020-10-19 PROCEDURE — 97140 MANUAL THERAPY 1/> REGIONS: CPT

## 2020-10-19 NOTE — PROGRESS NOTES
Dx: L TKA         Insurance (Authorized # of Visits):  15           Authorizing Physician: Dr. Flynn De Santiago MD visit: none scheduled  Fall Risk: standard         Precautions: n/a             Subjective: The patient comes into therapy with no pain.  She state demonstrate increased hip ER/ABD strength to 4+/5 to perform stepping and squatting activities without excessive femoral IR/ADD  · Pt will be independent and compliant with comprehensive HEP to maintain progress achieved in PT      Plan: POC will emphasize 4 sets L knee extension push off on stool, x 30 feet, 4 sets L knee extension push off on stool, x 30 feet, 4 sets L knee extension push off on stool, x 30 feet, 4 sets    4\" heel taps, x 10 reps, 2 sets 4\" heel taps, x 10 reps, 2 sets 6\" heel taps, x 1

## 2020-10-21 ENCOUNTER — APPOINTMENT (OUTPATIENT)
Dept: PHYSICAL THERAPY | Age: 60
End: 2020-10-21
Attending: PHYSICIAN ASSISTANT
Payer: COMMERCIAL

## 2020-10-23 ENCOUNTER — OFFICE VISIT (OUTPATIENT)
Dept: PHYSICAL THERAPY | Age: 60
End: 2020-10-23
Attending: PHYSICIAN ASSISTANT
Payer: COMMERCIAL

## 2020-10-23 PROCEDURE — 97140 MANUAL THERAPY 1/> REGIONS: CPT

## 2020-10-23 PROCEDURE — 97112 NEUROMUSCULAR REEDUCATION: CPT

## 2020-10-23 PROCEDURE — 97110 THERAPEUTIC EXERCISES: CPT

## 2020-10-23 NOTE — PROGRESS NOTES
Dx: L TKA         Insurance (Authorized # of Visits):  15           Authorizing Physician: Dr. Amy De Santiago MD visit: none scheduled  Fall Risk: standard         Precautions: n/a             Subjective:  The patient comes into therapy with VAS 1/10 left knee strength to 4+/5 to perform stepping and squatting activities without excessive femoral IR/ADD  · Pt will be independent and compliant with comprehensive HEP to maintain progress achieved in PT      Plan: POC will emphasize anterior/posterior TF glides, VT feet, 4 sets L knee extension push off on stool, x 30 feet, 4 sets L knee extension push off on stool, x 30 feet, 4 sets   4\" heel taps, x 10 reps, 2 sets 4\" heel taps, x 10 reps, 2 sets 6\" heel taps, x 10 reps, 2 sets 6\" heel taps, x 10 reps, 2 sets

## 2020-10-26 ENCOUNTER — OFFICE VISIT (OUTPATIENT)
Dept: PHYSICAL THERAPY | Age: 60
End: 2020-10-26
Attending: PHYSICIAN ASSISTANT
Payer: COMMERCIAL

## 2020-10-26 PROCEDURE — 97112 NEUROMUSCULAR REEDUCATION: CPT

## 2020-10-26 PROCEDURE — 97140 MANUAL THERAPY 1/> REGIONS: CPT

## 2020-10-26 PROCEDURE — 97110 THERAPEUTIC EXERCISES: CPT

## 2020-10-26 NOTE — PROGRESS NOTES
Dx: L TKA         Insurance (Authorized # of Visits):  15           Authorizing Physician: Dr. Mortimer Jaeger Next MD visit: none scheduled  Fall Risk: standard         Precautions: n/a             Subjective:  The patient comes into therapy with VAS 1/10 left knee improve her transfer efficiency  · Pt will demonstrate increased hip ER/ABD strength to 4+/5 to perform stepping and squatting activities without excessive femoral IR/ADD  · Pt will be independent and compliant with comprehensive HEP to maintain progress a extension push off, x 10 stairs, x 2 sets - 2 lb ankle weights   L knee extension push off on stool, x 30 feet, 4 sets L knee extension push off on stool, x 30 feet, 4 sets L knee extension push off on stool, x 30 feet, 4 sets L knee extension push off on

## 2020-10-28 ENCOUNTER — APPOINTMENT (OUTPATIENT)
Dept: PHYSICAL THERAPY | Age: 60
End: 2020-10-28
Attending: PHYSICIAN ASSISTANT
Payer: COMMERCIAL

## 2020-10-30 ENCOUNTER — OFFICE VISIT (OUTPATIENT)
Dept: PHYSICAL THERAPY | Age: 60
End: 2020-10-30
Attending: PHYSICIAN ASSISTANT
Payer: COMMERCIAL

## 2020-10-30 PROCEDURE — 97110 THERAPEUTIC EXERCISES: CPT

## 2020-10-30 PROCEDURE — 97112 NEUROMUSCULAR REEDUCATION: CPT

## 2020-10-30 PROCEDURE — 97140 MANUAL THERAPY 1/> REGIONS: CPT

## 2020-10-30 NOTE — PROGRESS NOTES
Dx: L TKA         Insurance (Authorized # of Visits):  15           Authorizing Physician: Dr. Salazar Diop  Next MD visit: none scheduled  Fall Risk: standard         Precautions: n/a             Subjective:  The patient comes into therapy with VAS 2/10 left knee sec STS test score to 13 reps to improve her transfer efficiency  · Pt will demonstrate increased hip ER/ABD strength to 4+/5 to perform stepping and squatting activities without excessive femoral IR/ADD  · Pt will be independent and compliant with compreh lb ankle weights Backward stair negotiation with LLE extension push off, x 10 stairs, x 2 sets - 2 lb ankle weights   L knee extension push off on stool, x 30 feet, 4 sets L knee extension push off on stool, x 30 feet, 4 sets L knee extension push off on s

## 2020-11-02 ENCOUNTER — OFFICE VISIT (OUTPATIENT)
Dept: PHYSICAL THERAPY | Age: 60
End: 2020-11-02
Attending: PHYSICIAN ASSISTANT
Payer: COMMERCIAL

## 2020-11-02 PROCEDURE — 97112 NEUROMUSCULAR REEDUCATION: CPT

## 2020-11-02 PROCEDURE — 97140 MANUAL THERAPY 1/> REGIONS: CPT

## 2020-11-02 PROCEDURE — 97110 THERAPEUTIC EXERCISES: CPT

## 2020-11-02 NOTE — PROGRESS NOTES
Dx: L TKA         Insurance (Authorized # of Visits):  15           Authorizing Physician: Dr. Randy De Santiago MD visit: none scheduled  Fall Risk: standard         Precautions: n/a             Subjective:  The patient comes into therapy with VAS 1/10 left knee to 4+/5 to perform stepping and squatting activities without excessive femoral IR/ADD  · Pt will be independent and compliant with comprehensive HEP to maintain progress achieved in PT      Plan: The patient will be formally reassessed at her next session off, x 10 stairs, x 2 sets - 2 lb ankle weights   L knee extension push off on stool, x 30 feet, 4 sets L knee extension push off on stool, x 30 feet, 4 sets L knee extension push off on stool, x 30 feet, 4 sets L knee extension push off on stool, x 30 fee

## 2020-11-04 ENCOUNTER — APPOINTMENT (OUTPATIENT)
Dept: PHYSICAL THERAPY | Age: 60
End: 2020-11-04
Attending: PHYSICIAN ASSISTANT
Payer: COMMERCIAL

## 2020-11-05 NOTE — PROGRESS NOTES
Discharge Summary    Pt has attended 12 visits in Physical Therapy. Subjective: The patient comes into therapy with VAS 2/10 left knee pain due to being very active and on her knees for three hours yesterday.  Radha Later feels therapy has made a significa feels she will be ready to go back to work in the next month. The patient still shows some left quad weakness, but she is functional and able to perform all activities without limitation.  Manuel Iglesias is able to negotiate the facility stair case reciprocally wit each Forward reciprocal lunges on double and single FP, x 10 reps, 2 sets each Forward reciprocal lunges on double and single FP, x 10 reps, 2 sets each --   LLE SLS balance - 4 mins LLE SLS balance - 4 mins LLE SLS balance - 4 mins --   Three way hip kick signed by therapist: Nilda Galan, PT

## 2020-11-06 ENCOUNTER — OFFICE VISIT (OUTPATIENT)
Dept: PHYSICAL THERAPY | Age: 60
End: 2020-11-06
Attending: PHYSICIAN ASSISTANT
Payer: COMMERCIAL

## 2020-11-06 PROCEDURE — 97110 THERAPEUTIC EXERCISES: CPT

## 2020-11-09 ENCOUNTER — APPOINTMENT (OUTPATIENT)
Dept: PHYSICAL THERAPY | Age: 60
End: 2020-11-09
Attending: PHYSICIAN ASSISTANT
Payer: COMMERCIAL

## 2020-11-11 ENCOUNTER — APPOINTMENT (OUTPATIENT)
Dept: PHYSICAL THERAPY | Age: 60
End: 2020-11-11
Attending: PHYSICIAN ASSISTANT
Payer: COMMERCIAL

## 2020-11-13 ENCOUNTER — APPOINTMENT (OUTPATIENT)
Dept: PHYSICAL THERAPY | Age: 60
End: 2020-11-13
Attending: PHYSICIAN ASSISTANT
Payer: COMMERCIAL

## 2020-11-16 ENCOUNTER — APPOINTMENT (OUTPATIENT)
Dept: PHYSICAL THERAPY | Age: 60
End: 2020-11-16
Attending: PHYSICIAN ASSISTANT
Payer: COMMERCIAL

## 2020-11-18 ENCOUNTER — APPOINTMENT (OUTPATIENT)
Dept: PHYSICAL THERAPY | Age: 60
End: 2020-11-18
Attending: PHYSICIAN ASSISTANT
Payer: COMMERCIAL

## 2020-11-20 ENCOUNTER — APPOINTMENT (OUTPATIENT)
Dept: PHYSICAL THERAPY | Age: 60
End: 2020-11-20
Attending: PHYSICIAN ASSISTANT
Payer: COMMERCIAL

## 2020-11-23 ENCOUNTER — APPOINTMENT (OUTPATIENT)
Dept: PHYSICAL THERAPY | Age: 60
End: 2020-11-23
Attending: PHYSICIAN ASSISTANT
Payer: COMMERCIAL

## 2020-11-25 ENCOUNTER — APPOINTMENT (OUTPATIENT)
Dept: PHYSICAL THERAPY | Age: 60
End: 2020-11-25
Attending: PHYSICIAN ASSISTANT
Payer: COMMERCIAL

## 2020-11-27 ENCOUNTER — APPOINTMENT (OUTPATIENT)
Dept: PHYSICAL THERAPY | Age: 60
End: 2020-11-27
Attending: PHYSICIAN ASSISTANT
Payer: COMMERCIAL

## 2020-11-30 ENCOUNTER — APPOINTMENT (OUTPATIENT)
Dept: PHYSICAL THERAPY | Age: 60
End: 2020-11-30
Attending: PHYSICIAN ASSISTANT
Payer: COMMERCIAL

## 2020-12-19 ENCOUNTER — OFFICE VISIT (OUTPATIENT)
Dept: INTERNAL MEDICINE CLINIC | Facility: CLINIC | Age: 60
End: 2020-12-19
Payer: COMMERCIAL

## 2020-12-19 VITALS
HEART RATE: 61 BPM | OXYGEN SATURATION: 98 % | TEMPERATURE: 99 F | WEIGHT: 155.75 LBS | HEIGHT: 66 IN | BODY MASS INDEX: 25.03 KG/M2 | RESPIRATION RATE: 12 BRPM | DIASTOLIC BLOOD PRESSURE: 86 MMHG | SYSTOLIC BLOOD PRESSURE: 136 MMHG

## 2020-12-19 DIAGNOSIS — E03.9 HYPOTHYROIDISM, UNSPECIFIED TYPE: ICD-10-CM

## 2020-12-19 DIAGNOSIS — E78.00 PURE HYPERCHOLESTEROLEMIA: ICD-10-CM

## 2020-12-19 DIAGNOSIS — E55.9 VITAMIN D DEFICIENCY: ICD-10-CM

## 2020-12-19 DIAGNOSIS — I10 ESSENTIAL HYPERTENSION, BENIGN: Primary | ICD-10-CM

## 2020-12-19 PROCEDURE — 3008F BODY MASS INDEX DOCD: CPT | Performed by: FAMILY MEDICINE

## 2020-12-19 PROCEDURE — 3075F SYST BP GE 130 - 139MM HG: CPT | Performed by: FAMILY MEDICINE

## 2020-12-19 PROCEDURE — 99214 OFFICE O/P EST MOD 30 MIN: CPT | Performed by: FAMILY MEDICINE

## 2020-12-19 PROCEDURE — 3079F DIAST BP 80-89 MM HG: CPT | Performed by: FAMILY MEDICINE

## 2020-12-19 RX ORDER — LEVOTHYROXINE SODIUM 112 UG/1
112 TABLET ORAL
Qty: 90 TABLET | Refills: 1 | Status: SHIPPED | OUTPATIENT
Start: 2020-12-19 | End: 2021-06-28

## 2020-12-19 RX ORDER — HYDROCHLOROTHIAZIDE 12.5 MG/1
12.5 TABLET ORAL
Qty: 90 TABLET | Refills: 1 | Status: SHIPPED | OUTPATIENT
Start: 2020-12-19 | End: 2021-06-28

## 2020-12-19 RX ORDER — IRBESARTAN 300 MG/1
300 TABLET ORAL
Qty: 90 TABLET | Refills: 1 | Status: SHIPPED | OUTPATIENT
Start: 2020-12-19 | End: 2021-06-28

## 2020-12-19 RX ORDER — PRAVASTATIN SODIUM 40 MG
40 TABLET ORAL
Qty: 90 TABLET | Refills: 1 | Status: SHIPPED | OUTPATIENT
Start: 2020-12-19 | End: 2021-06-28

## 2020-12-19 NOTE — PROGRESS NOTES
Charlette Nobles is a 56year old female.   HPI:   Here for med ck   Is taking her meds as directed   No issues w them   Staying healthy     Got her flu shot already   Asking about the covid vaccine  No BP cks at home   No CP  No SOB  No HAs   The L TKR went we headaches    EXAM:   /86   Pulse 61   Temp 98.7 °F (37.1 °C) (Oral)   Resp 12   Ht 5' 6\" (1.676 m)   Wt 155 lb 12 oz (70.6 kg)   LMP  (LMP Unknown)   SpO2 98%   Breastfeeding No   BMI 25.14 kg/m²   GENERAL: well developed, well nourished,in no appar

## 2021-03-17 ENCOUNTER — IMMUNIZATION (OUTPATIENT)
Dept: LAB | Age: 61
End: 2021-03-17
Attending: HOSPITALIST
Payer: COMMERCIAL

## 2021-03-17 DIAGNOSIS — Z23 NEED FOR VACCINATION: Primary | ICD-10-CM

## 2021-03-17 PROCEDURE — 0001A SARSCOV2 VAC 30MCG/0.3ML IM: CPT

## 2021-04-09 ENCOUNTER — IMMUNIZATION (OUTPATIENT)
Dept: LAB | Age: 61
End: 2021-04-09
Attending: HOSPITALIST
Payer: COMMERCIAL

## 2021-04-09 DIAGNOSIS — Z23 NEED FOR VACCINATION: Primary | ICD-10-CM

## 2021-04-09 PROCEDURE — 0002A SARSCOV2 VAC 30MCG/0.3ML IM: CPT

## 2021-06-28 ENCOUNTER — OFFICE VISIT (OUTPATIENT)
Dept: INTERNAL MEDICINE CLINIC | Facility: CLINIC | Age: 61
End: 2021-06-28
Payer: COMMERCIAL

## 2021-06-28 VITALS
DIASTOLIC BLOOD PRESSURE: 64 MMHG | OXYGEN SATURATION: 98 % | HEIGHT: 67 IN | WEIGHT: 152 LBS | SYSTOLIC BLOOD PRESSURE: 118 MMHG | RESPIRATION RATE: 16 BRPM | BODY MASS INDEX: 23.86 KG/M2 | TEMPERATURE: 98 F | HEART RATE: 68 BPM

## 2021-06-28 DIAGNOSIS — Z00.00 WELLNESS EXAMINATION: Primary | ICD-10-CM

## 2021-06-28 DIAGNOSIS — I10 ESSENTIAL HYPERTENSION, BENIGN: ICD-10-CM

## 2021-06-28 DIAGNOSIS — E03.9 HYPOTHYROIDISM, UNSPECIFIED TYPE: ICD-10-CM

## 2021-06-28 DIAGNOSIS — Z12.31 ENCOUNTER FOR SCREENING MAMMOGRAM FOR MALIGNANT NEOPLASM OF BREAST: ICD-10-CM

## 2021-06-28 DIAGNOSIS — Z00.00 LABORATORY EXAM ORDERED AS PART OF ROUTINE GENERAL MEDICAL EXAMINATION: ICD-10-CM

## 2021-06-28 PROCEDURE — 3078F DIAST BP <80 MM HG: CPT | Performed by: FAMILY MEDICINE

## 2021-06-28 PROCEDURE — 3074F SYST BP LT 130 MM HG: CPT | Performed by: FAMILY MEDICINE

## 2021-06-28 PROCEDURE — 3008F BODY MASS INDEX DOCD: CPT | Performed by: FAMILY MEDICINE

## 2021-06-28 PROCEDURE — 99396 PREV VISIT EST AGE 40-64: CPT | Performed by: FAMILY MEDICINE

## 2021-06-28 RX ORDER — LEVOTHYROXINE SODIUM 112 UG/1
112 TABLET ORAL
Qty: 90 TABLET | Refills: 1 | Status: SHIPPED | OUTPATIENT
Start: 2021-06-28 | End: 2022-01-19

## 2021-06-28 RX ORDER — HYDROCHLOROTHIAZIDE 12.5 MG/1
12.5 TABLET ORAL
Qty: 90 TABLET | Refills: 1 | Status: SHIPPED | OUTPATIENT
Start: 2021-06-28 | End: 2022-01-18

## 2021-06-28 RX ORDER — PRAVASTATIN SODIUM 40 MG
40 TABLET ORAL
Qty: 90 TABLET | Refills: 1 | Status: SHIPPED | OUTPATIENT
Start: 2021-06-28 | End: 2022-01-18

## 2021-06-28 RX ORDER — IRBESARTAN 300 MG/1
300 TABLET ORAL
Qty: 90 TABLET | Refills: 1 | Status: SHIPPED | OUTPATIENT
Start: 2021-06-28 | End: 2022-01-18

## 2021-06-28 NOTE — PROGRESS NOTES
HPI:   Carmella Moran is a 56year old female who presents for a complete physical exam.     Wt Readings from Last 6 Encounters:  06/28/21 : 152 lb (68.9 kg)  12/19/20 : 155 lb 12 oz (70.6 kg)  08/27/20 : 150 lb 6.4 oz (68.2 kg)  06/22/20 : 152 lb (68.9 kg) External Cream Apply topically 2 (two) times daily as needed.  60 g 3      Past Medical History:   Diagnosis Date   • Body piercing    • Decorative tattoo    • Flatulence/gas pain/belching    • High cholesterol    • Leaking of urine    • Osteoarthrosis, uns ordered        SKIN: denies rash  HEENT: denies nasal congestion, sinus pain or ST  LUNGS: denies shortness of breath  No cough  CARDIOVASCULAR: denies chest pain on exertion  No palpitations    GI: denies abdominal pain  : denies dysuria   NEURO: denies

## 2021-06-29 ENCOUNTER — HOSPITAL ENCOUNTER (OUTPATIENT)
Dept: MAMMOGRAPHY | Facility: HOSPITAL | Age: 61
Discharge: HOME | End: 2021-06-29
Attending: FAMILY MEDICINE
Payer: COMMERCIAL

## 2021-06-29 DIAGNOSIS — Z12.31 ENCOUNTER FOR SCREENING MAMMOGRAM FOR MALIGNANT NEOPLASM OF BREAST: ICD-10-CM

## 2021-06-29 PROCEDURE — 77067 SCR MAMMO BI INCL CAD: CPT | Performed by: FAMILY MEDICINE

## 2021-06-29 PROCEDURE — 77063 BREAST TOMOSYNTHESIS BI: CPT | Performed by: FAMILY MEDICINE

## 2021-06-30 ENCOUNTER — TELEPHONE (OUTPATIENT)
Dept: INTERNAL MEDICINE CLINIC | Facility: CLINIC | Age: 61
End: 2021-06-30

## 2021-06-30 NOTE — TELEPHONE ENCOUNTER
Pt stated she was advised on her last visit to get the shingles shot, pt got one in 2013, pt wants to know if she should make an appointment right away or can she wait till her next appt in 6 months to get the shingles shot?

## 2021-07-02 LAB
ABSOLUTE BASOPHILS: 39 CELLS/UL (ref 0–200)
ABSOLUTE EOSINOPHILS: 108 CELLS/UL (ref 15–500)
ABSOLUTE LYMPHOCYTES: 1284 CELLS/UL (ref 850–3900)
ABSOLUTE MONOCYTES: 495 CELLS/UL (ref 200–950)
ABSOLUTE NEUTROPHILS: 2974 CELLS/UL (ref 1500–7800)
ALBUMIN/GLOBULIN RATIO: 2.3 (CALC) (ref 1–2.5)
ALBUMIN: 4.4 G/DL (ref 3.6–5.1)
ALKALINE PHOSPHATASE: 62 U/L (ref 37–153)
ALT: 15 U/L (ref 6–29)
APPEARANCE: CLEAR
AST: 16 U/L (ref 10–35)
BASOPHILS: 0.8 %
BILIRUBIN, TOTAL: 0.8 MG/DL (ref 0.2–1.2)
BILIRUBIN: NEGATIVE
BUN: 16 MG/DL (ref 7–25)
CALCIUM: 9.9 MG/DL (ref 8.6–10.4)
CARBON DIOXIDE: 30 MMOL/L (ref 20–32)
CHLORIDE: 103 MMOL/L (ref 98–110)
CHOL/HDLC RATIO: 3 (CALC)
CHOLESTEROL, TOTAL: 161 MG/DL
COLOR: YELLOW
CREATININE: 0.74 MG/DL (ref 0.5–0.99)
EGFR IF AFRICN AM: 101 ML/MIN/1.73M2
EGFR IF NONAFRICN AM: 87 ML/MIN/1.73M2
EOSINOPHILS: 2.2 %
GLOBULIN: 1.9 G/DL (CALC) (ref 1.9–3.7)
GLUCOSE: 92 MG/DL (ref 65–99)
GLUCOSE: NEGATIVE
HDL CHOLESTEROL: 54 MG/DL
HEMATOCRIT: 35.9 % (ref 35–45)
HEMOGLOBIN: 11.7 G/DL (ref 11.7–15.5)
KETONES: NEGATIVE
LDL-CHOLESTEROL: 89 MG/DL (CALC)
LEUKOCYTE ESTERASE: NEGATIVE
LYMPHOCYTES: 26.2 %
MCH: 28 PG (ref 27–33)
MCHC: 32.6 G/DL (ref 32–36)
MCV: 85.9 FL (ref 80–100)
MONOCYTES: 10.1 %
MPV: 9.8 FL (ref 7.5–12.5)
NEUTROPHILS: 60.7 %
NITRITE: NEGATIVE
NON-HDL CHOLESTEROL: 107 MG/DL (CALC)
OCCULT BLOOD: NEGATIVE
PH: 7.5 (ref 5–8)
PLATELET COUNT: 226 THOUSAND/UL (ref 140–400)
POTASSIUM: 4.1 MMOL/L (ref 3.5–5.3)
PROTEIN, TOTAL: 6.3 G/DL (ref 6.1–8.1)
PROTEIN: NEGATIVE
RDW: 15.6 % (ref 11–15)
RED BLOOD CELL COUNT: 4.18 MILLION/UL (ref 3.8–5.1)
SODIUM: 139 MMOL/L (ref 135–146)
SPECIFIC GRAVITY: 1.02 (ref 1–1.03)
T4, FREE: 1.5 NG/DL (ref 0.8–1.8)
TRIGLYCERIDES: 89 MG/DL
TSH: 2.8 MIU/L (ref 0.4–4.5)
VITAMIN D, 25-OH, TOTAL: 47 NG/ML (ref 30–100)
WHITE BLOOD CELL COUNT: 4.9 THOUSAND/UL (ref 3.8–10.8)

## 2021-07-07 ENCOUNTER — HOSPITAL ENCOUNTER (OUTPATIENT)
Dept: MAMMOGRAPHY | Facility: HOSPITAL | Age: 61
Discharge: HOME | End: 2021-07-07
Attending: FAMILY MEDICINE
Payer: COMMERCIAL

## 2021-07-07 DIAGNOSIS — R92.2 INCONCLUSIVE MAMMOGRAM: ICD-10-CM

## 2021-07-07 PROCEDURE — 77061 BREAST TOMOSYNTHESIS UNI: CPT | Performed by: FAMILY MEDICINE

## 2021-07-07 PROCEDURE — 76642 ULTRASOUND BREAST LIMITED: CPT | Performed by: FAMILY MEDICINE

## 2021-07-07 PROCEDURE — 77065 DX MAMMO INCL CAD UNI: CPT | Performed by: FAMILY MEDICINE

## 2022-01-17 RX ORDER — PRAVASTATIN SODIUM 40 MG
TABLET ORAL
Qty: 90 TABLET | Refills: 0 | OUTPATIENT
Start: 2022-01-17

## 2022-01-17 RX ORDER — HYDROCHLOROTHIAZIDE 12.5 MG/1
TABLET ORAL
Qty: 90 TABLET | Refills: 0 | OUTPATIENT
Start: 2022-01-17

## 2022-01-17 RX ORDER — IRBESARTAN 300 MG/1
TABLET ORAL
Qty: 90 TABLET | Refills: 0 | OUTPATIENT
Start: 2022-01-17

## 2022-01-17 NOTE — TELEPHONE ENCOUNTER
Name from pharmacy: Irbesartan 300 MG Oral Tablet          Will file in chart as: IRBESARTAN 300 MG Oral Tab    Sig: Take 1 tablet by mouth once daily    Disp:  90 tablet    Refills:  0    Start: 1/17/2022    Class: Normal    Non-formulary    Last ordered:

## 2022-01-19 ENCOUNTER — OFFICE VISIT (OUTPATIENT)
Dept: INTERNAL MEDICINE CLINIC | Facility: CLINIC | Age: 62
End: 2022-01-19
Payer: COMMERCIAL

## 2022-01-19 VITALS
SYSTOLIC BLOOD PRESSURE: 135 MMHG | RESPIRATION RATE: 18 BRPM | TEMPERATURE: 98 F | HEIGHT: 67 IN | OXYGEN SATURATION: 98 % | HEART RATE: 89 BPM | BODY MASS INDEX: 25.06 KG/M2 | WEIGHT: 159.63 LBS | DIASTOLIC BLOOD PRESSURE: 78 MMHG

## 2022-01-19 DIAGNOSIS — E03.9 HYPOTHYROIDISM, UNSPECIFIED TYPE: ICD-10-CM

## 2022-01-19 DIAGNOSIS — L98.9 SKIN LESION: ICD-10-CM

## 2022-01-19 DIAGNOSIS — E78.00 PURE HYPERCHOLESTEROLEMIA: ICD-10-CM

## 2022-01-19 DIAGNOSIS — R92.8 ABNORMAL MAMMOGRAM OF LEFT BREAST: ICD-10-CM

## 2022-01-19 DIAGNOSIS — I10 ESSENTIAL HYPERTENSION, BENIGN: Primary | ICD-10-CM

## 2022-01-19 PROBLEM — H40.89 OTHER SPECIFIED GLAUCOMA: Status: ACTIVE | Noted: 2022-01-19

## 2022-01-19 PROCEDURE — 99214 OFFICE O/P EST MOD 30 MIN: CPT | Performed by: FAMILY MEDICINE

## 2022-01-19 PROCEDURE — 3008F BODY MASS INDEX DOCD: CPT | Performed by: FAMILY MEDICINE

## 2022-01-19 PROCEDURE — 96127 BRIEF EMOTIONAL/BEHAV ASSMT: CPT | Performed by: FAMILY MEDICINE

## 2022-01-19 PROCEDURE — 3075F SYST BP GE 130 - 139MM HG: CPT | Performed by: FAMILY MEDICINE

## 2022-01-19 PROCEDURE — 3078F DIAST BP <80 MM HG: CPT | Performed by: FAMILY MEDICINE

## 2022-01-19 RX ORDER — PRAVASTATIN SODIUM 40 MG
40 TABLET ORAL
Qty: 30 TABLET | Refills: 0 | Status: SHIPPED
Start: 2022-01-19 | End: 2022-01-19

## 2022-01-19 RX ORDER — IRBESARTAN 300 MG/1
300 TABLET ORAL
Qty: 30 TABLET | Refills: 0 | Status: SHIPPED
Start: 2022-01-19 | End: 2022-01-19

## 2022-01-19 RX ORDER — IRBESARTAN 300 MG/1
300 TABLET ORAL
Qty: 90 TABLET | Refills: 1 | Status: SHIPPED | OUTPATIENT
Start: 2022-01-19

## 2022-01-19 RX ORDER — LEVOTHYROXINE SODIUM 112 UG/1
112 TABLET ORAL
Qty: 90 TABLET | Refills: 1 | Status: SHIPPED | OUTPATIENT
Start: 2022-01-19

## 2022-01-19 RX ORDER — LATANOPROST 50 UG/ML
1 SOLUTION/ DROPS OPHTHALMIC NIGHTLY
COMMUNITY
Start: 2021-12-14

## 2022-01-19 RX ORDER — PRAVASTATIN SODIUM 40 MG
40 TABLET ORAL
Qty: 90 TABLET | Refills: 1 | Status: SHIPPED | OUTPATIENT
Start: 2022-01-19

## 2022-01-19 RX ORDER — HYDROCHLOROTHIAZIDE 12.5 MG/1
12.5 TABLET ORAL
Qty: 90 TABLET | Refills: 1 | Status: SHIPPED | OUTPATIENT
Start: 2022-01-19

## 2022-01-19 RX ORDER — HYDROCHLOROTHIAZIDE 12.5 MG/1
12.5 TABLET ORAL
Qty: 30 TABLET | Refills: 0 | Status: SHIPPED | OUTPATIENT
Start: 2022-01-19 | End: 2022-01-19

## 2022-01-19 NOTE — PROGRESS NOTES
Nguyen Santillan is a 59year old female. HPI:   Pt is here for med ck/follow up. Overall is doing well. Is taking meds as directed. No issues w medications.   BP runs 130/80 up and down  Is vaccinated x3   Overall feels well   Stress w ailing parents and No       REVIEW OF SYSTEMS:   GENERAL HEALTH: feels well otherwise      EXAM:   /78   Pulse 89   Temp 97.9 °F (36.6 °C) (Oral)   Resp 18   Ht 5' 7\" (1.702 m)   Wt 159 lb 9.6 oz (72.4 kg)   LMP  (LMP Unknown)   SpO2 98%   BMI 25.00 kg/m²   GENERAL: w

## 2022-01-31 ENCOUNTER — HOSPITAL ENCOUNTER (OUTPATIENT)
Dept: MAMMOGRAPHY | Facility: HOSPITAL | Age: 62
Discharge: HOME | End: 2022-01-31
Attending: FAMILY MEDICINE
Payer: COMMERCIAL

## 2022-01-31 DIAGNOSIS — R92.8 ABNORMAL MAMMOGRAM OF LEFT BREAST: ICD-10-CM

## 2022-01-31 PROCEDURE — 77065 DX MAMMO INCL CAD UNI: CPT | Performed by: FAMILY MEDICINE

## 2022-01-31 PROCEDURE — 77061 BREAST TOMOSYNTHESIS UNI: CPT | Performed by: FAMILY MEDICINE

## 2022-01-31 PROCEDURE — 76642 ULTRASOUND BREAST LIMITED: CPT | Performed by: FAMILY MEDICINE

## 2022-03-07 ENCOUNTER — ORDER TRANSCRIPTION (OUTPATIENT)
Dept: ADMINISTRATIVE | Facility: HOSPITAL | Age: 62
End: 2022-03-07

## 2022-03-23 ENCOUNTER — TELEPHONE (OUTPATIENT)
Dept: INTERNAL MEDICINE CLINIC | Facility: CLINIC | Age: 62
End: 2022-03-23

## 2022-03-24 NOTE — TELEPHONE ENCOUNTER
Faxed paperwork with confirmation    Sent to scan as well as placed in blue accordion folder in pod 3

## 2022-05-16 ENCOUNTER — HOSPITAL ENCOUNTER (OUTPATIENT)
Dept: CT IMAGING | Facility: HOSPITAL | Age: 62
Discharge: HOME | End: 2022-05-16
Attending: FAMILY MEDICINE

## 2022-05-16 DIAGNOSIS — Z13.6 SCREENING FOR CARDIOVASCULAR CONDITION: ICD-10-CM

## 2022-07-25 RX ORDER — HYDROCHLOROTHIAZIDE 12.5 MG/1
12.5 TABLET ORAL
Qty: 90 TABLET | Refills: 0 | Status: SHIPPED | OUTPATIENT
Start: 2022-07-25

## 2022-07-25 NOTE — TELEPHONE ENCOUNTER
Pr requesting refill - per pt she has about a week left.     hydrochlorothiazide 12.5 MG Oral Tab    [facility], operated by [facility] [address] [phone], [phone]    Future Appointments   Date Time Provider Radha Gloria   8/10/2022 11:00 AM Risa Fuller MD [facility] 8 [facility] [place]     Pt had to r/s previous cpx appt due to testing positive for covid

## 2022-08-10 ENCOUNTER — OFFICE VISIT (OUTPATIENT)
Dept: INTERNAL MEDICINE CLINIC | Facility: CLINIC | Age: 62
End: 2022-08-10
Payer: COMMERCIAL

## 2022-08-10 VITALS
OXYGEN SATURATION: 98 % | SYSTOLIC BLOOD PRESSURE: 122 MMHG | HEIGHT: 65.75 IN | BODY MASS INDEX: 26.81 KG/M2 | TEMPERATURE: 98 F | DIASTOLIC BLOOD PRESSURE: 88 MMHG | WEIGHT: 164.81 LBS | HEART RATE: 64 BPM

## 2022-08-10 DIAGNOSIS — I10 ESSENTIAL HYPERTENSION, BENIGN: ICD-10-CM

## 2022-08-10 DIAGNOSIS — R92.8 ABNORMAL MAMMOGRAM: ICD-10-CM

## 2022-08-10 DIAGNOSIS — Z00.00 LABORATORY EXAM ORDERED AS PART OF ROUTINE GENERAL MEDICAL EXAMINATION: ICD-10-CM

## 2022-08-10 DIAGNOSIS — E03.9 HYPOTHYROIDISM, UNSPECIFIED TYPE: ICD-10-CM

## 2022-08-10 DIAGNOSIS — R20.2 TINGLING: ICD-10-CM

## 2022-08-10 DIAGNOSIS — H93.13 TINNITUS OF BOTH EARS: ICD-10-CM

## 2022-08-10 DIAGNOSIS — Z00.00 WELLNESS EXAMINATION: Primary | ICD-10-CM

## 2022-08-10 PROCEDURE — 3079F DIAST BP 80-89 MM HG: CPT | Performed by: FAMILY MEDICINE

## 2022-08-10 PROCEDURE — 99213 OFFICE O/P EST LOW 20 MIN: CPT | Performed by: FAMILY MEDICINE

## 2022-08-10 PROCEDURE — 90750 HZV VACC RECOMBINANT IM: CPT | Performed by: FAMILY MEDICINE

## 2022-08-10 PROCEDURE — 3074F SYST BP LT 130 MM HG: CPT | Performed by: FAMILY MEDICINE

## 2022-08-10 PROCEDURE — 90471 IMMUNIZATION ADMIN: CPT | Performed by: FAMILY MEDICINE

## 2022-08-10 PROCEDURE — 99396 PREV VISIT EST AGE 40-64: CPT | Performed by: FAMILY MEDICINE

## 2022-08-10 PROCEDURE — 3008F BODY MASS INDEX DOCD: CPT | Performed by: FAMILY MEDICINE

## 2022-08-10 RX ORDER — IRBESARTAN 300 MG/1
300 TABLET ORAL
Qty: 90 TABLET | Refills: 1 | Status: SHIPPED | OUTPATIENT
Start: 2022-08-10

## 2022-08-10 RX ORDER — LEVOTHYROXINE SODIUM 112 UG/1
112 TABLET ORAL
Qty: 90 TABLET | Refills: 1 | Status: SHIPPED | OUTPATIENT
Start: 2022-08-10

## 2022-08-10 RX ORDER — FLUTICASONE PROPIONATE 50 MCG
SPRAY, SUSPENSION (ML) NASAL
Refills: 0 | Status: CANCELLED | OUTPATIENT
Start: 2022-08-10

## 2022-08-10 RX ORDER — IBUPROFEN 600 MG/1
600 TABLET ORAL 3 TIMES DAILY
COMMUNITY
Start: 2022-03-18

## 2022-08-10 RX ORDER — HYDROCHLOROTHIAZIDE 12.5 MG/1
12.5 TABLET ORAL
Qty: 90 TABLET | Refills: 0 | Status: SHIPPED | OUTPATIENT
Start: 2022-08-10

## 2022-08-10 RX ORDER — PRAVASTATIN SODIUM 40 MG
40 TABLET ORAL
Qty: 90 TABLET | Refills: 1 | Status: SHIPPED | OUTPATIENT
Start: 2022-08-10

## 2022-08-12 LAB
ABSOLUTE BASOPHILS: 58 CELLS/UL (ref 0–200)
ABSOLUTE EOSINOPHILS: 180 CELLS/UL (ref 15–500)
ABSOLUTE LYMPHOCYTES: 1123 CELLS/UL (ref 850–3900)
ABSOLUTE MONOCYTES: 648 CELLS/UL (ref 200–950)
ABSOLUTE NEUTROPHILS: 5191 CELLS/UL (ref 1500–7800)
ALBUMIN/GLOBULIN RATIO: 1.9 (CALC) (ref 1–2.5)
ALBUMIN: 4.5 G/DL (ref 3.6–5.1)
ALKALINE PHOSPHATASE: 75 U/L (ref 37–153)
ALT: 15 U/L (ref 6–29)
AST: 13 U/L (ref 10–35)
BASOPHILS: 0.8 %
BILIRUBIN, TOTAL: 0.7 MG/DL (ref 0.2–1.2)
BILIRUBIN: NEGATIVE
BUN: 15 MG/DL (ref 7–25)
CALCIUM: 9.8 MG/DL (ref 8.6–10.4)
CARBON DIOXIDE: 29 MMOL/L (ref 20–32)
CHLORIDE: 101 MMOL/L (ref 98–110)
CHOL/HDLC RATIO: 3.2 (CALC)
CHOLESTEROL, TOTAL: 162 MG/DL
COLOR: YELLOW
CREATININE: 0.81 MG/DL (ref 0.5–1.05)
EGFR: 82 ML/MIN/1.73M2
EOSINOPHILS: 2.5 %
GLOBULIN: 2.4 G/DL (CALC) (ref 1.9–3.7)
GLUCOSE: 93 MG/DL (ref 65–99)
GLUCOSE: NEGATIVE
HDL CHOLESTEROL: 51 MG/DL
HEMATOCRIT: 40 % (ref 35–45)
HEMOGLOBIN: 13.1 G/DL (ref 11.7–15.5)
KETONES: NEGATIVE
LDL-CHOLESTEROL: 86 MG/DL (CALC)
LEUKOCYTE ESTERASE: NEGATIVE
LYMPHOCYTES: 15.6 %
MCH: 29.1 PG (ref 27–33)
MCHC: 32.8 G/DL (ref 32–36)
MCV: 88.9 FL (ref 80–100)
MONOCYTES: 9 %
MPV: 9.7 FL (ref 7.5–12.5)
NEUTROPHILS: 72.1 %
NITRITE: NEGATIVE
NON-HDL CHOLESTEROL: 111 MG/DL (CALC)
OCCULT BLOOD: NEGATIVE
PH: 6.5 (ref 5–8)
PLATELET COUNT: 245 THOUSAND/UL (ref 140–400)
POTASSIUM: 4.2 MMOL/L (ref 3.5–5.3)
PROTEIN, TOTAL: 6.9 G/DL (ref 6.1–8.1)
PROTEIN: NEGATIVE
RDW: 13.6 % (ref 11–15)
RED BLOOD CELL COUNT: 4.5 MILLION/UL (ref 3.8–5.1)
SODIUM: 137 MMOL/L (ref 135–146)
SPECIFIC GRAVITY: 1.02 (ref 1–1.03)
T4, FREE: 1.6 NG/DL (ref 0.8–1.8)
TRIGLYCERIDES: 155 MG/DL
TSH: 1.84 MIU/L (ref 0.4–4.5)
WHITE BLOOD CELL COUNT: 7.2 THOUSAND/UL (ref 3.8–10.8)

## 2022-08-19 RX ORDER — FLUTICASONE PROPIONATE 50 MCG
2 SPRAY, SUSPENSION (ML) NASAL DAILY
Qty: 48 G | Refills: 0 | Status: SHIPPED | OUTPATIENT
Start: 2022-08-19

## 2022-08-19 NOTE — TELEPHONE ENCOUNTER
Refill needed.     Fluticasone Propionate 50 MCG/ACT Nasal [place] [address] [facility] 1485, [phone]

## 2022-09-29 ENCOUNTER — HOSPITAL ENCOUNTER (OUTPATIENT)
Dept: MAMMOGRAPHY | Facility: HOSPITAL | Age: 62
Discharge: HOME | End: 2022-09-29
Attending: FAMILY MEDICINE
Payer: COMMERCIAL

## 2022-09-29 ENCOUNTER — TELEPHONE (OUTPATIENT)
Dept: INTERNAL MEDICINE CLINIC | Facility: CLINIC | Age: 62
End: 2022-09-29

## 2022-09-29 DIAGNOSIS — R92.8 ABNORMAL MAMMOGRAM: ICD-10-CM

## 2022-09-29 DIAGNOSIS — R92.8 ABNORMAL MAMMOGRAM: Primary | ICD-10-CM

## 2022-09-29 PROCEDURE — 77062 BREAST TOMOSYNTHESIS BI: CPT | Performed by: FAMILY MEDICINE

## 2022-09-29 PROCEDURE — 76642 ULTRASOUND BREAST LIMITED: CPT | Performed by: FAMILY MEDICINE

## 2022-09-29 PROCEDURE — 77066 DX MAMMO INCL CAD BI: CPT | Performed by: FAMILY MEDICINE

## 2022-10-12 ENCOUNTER — OFFICE VISIT (OUTPATIENT)
Facility: LOCATION | Age: 62
End: 2022-10-12
Payer: COMMERCIAL

## 2022-10-12 DIAGNOSIS — H93.13 TINNITUS OF BOTH EARS: Primary | ICD-10-CM

## 2022-10-12 DIAGNOSIS — H93.293 ABNORMAL AUDITORY PERCEPTION OF BOTH EARS: Primary | ICD-10-CM

## 2022-10-12 PROCEDURE — 92567 TYMPANOMETRY: CPT | Performed by: AUDIOLOGIST

## 2022-10-12 PROCEDURE — 92557 COMPREHENSIVE HEARING TEST: CPT | Performed by: AUDIOLOGIST

## 2022-10-12 PROCEDURE — 99213 OFFICE O/P EST LOW 20 MIN: CPT | Performed by: OTOLARYNGOLOGY

## 2022-10-12 NOTE — PROGRESS NOTES
Italia Polo was seen for audiometric evaluation today. Referred back to physician.      Nicholas Paul

## 2022-11-02 ENCOUNTER — TELEPHONE (OUTPATIENT)
Dept: INTERNAL MEDICINE CLINIC | Facility: CLINIC | Age: 62
End: 2022-11-02

## 2022-11-02 NOTE — TELEPHONE ENCOUNTER
Pt scheduled for second shingles inj. Order needed.     Future Appointments   Date Time Provider Radha Castro   11/11/2022 11:30 AM [facility] 08 NURSE [facility] [place]

## 2022-11-11 ENCOUNTER — NURSE ONLY (OUTPATIENT)
Dept: INTERNAL MEDICINE CLINIC | Facility: CLINIC | Age: 62
End: 2022-11-11
Payer: COMMERCIAL

## 2022-11-11 PROCEDURE — 90471 IMMUNIZATION ADMIN: CPT | Performed by: FAMILY MEDICINE

## 2022-11-11 PROCEDURE — 90750 HZV VACC RECOMBINANT IM: CPT | Performed by: FAMILY MEDICINE

## 2022-11-21 ENCOUNTER — OFFICE VISIT (OUTPATIENT)
Dept: INTERNAL MEDICINE CLINIC | Facility: CLINIC | Age: 62
End: 2022-11-21
Payer: COMMERCIAL

## 2022-11-21 VITALS
HEIGHT: 65.75 IN | DIASTOLIC BLOOD PRESSURE: 70 MMHG | OXYGEN SATURATION: 98 % | TEMPERATURE: 98 F | SYSTOLIC BLOOD PRESSURE: 130 MMHG | BODY MASS INDEX: 27.46 KG/M2 | WEIGHT: 168.81 LBS | HEART RATE: 101 BPM

## 2022-11-21 DIAGNOSIS — R20.2 TINGLING SENSATION: Primary | ICD-10-CM

## 2022-11-21 PROCEDURE — 90471 IMMUNIZATION ADMIN: CPT | Performed by: FAMILY MEDICINE

## 2022-11-21 PROCEDURE — 90715 TDAP VACCINE 7 YRS/> IM: CPT | Performed by: FAMILY MEDICINE

## 2022-11-21 PROCEDURE — 3078F DIAST BP <80 MM HG: CPT | Performed by: FAMILY MEDICINE

## 2022-11-21 PROCEDURE — 3008F BODY MASS INDEX DOCD: CPT | Performed by: FAMILY MEDICINE

## 2022-11-21 PROCEDURE — 3075F SYST BP GE 130 - 139MM HG: CPT | Performed by: FAMILY MEDICINE

## 2022-11-21 PROCEDURE — 99213 OFFICE O/P EST LOW 20 MIN: CPT | Performed by: FAMILY MEDICINE

## 2022-11-21 RX ORDER — PREDNISONE 10 MG/1
TABLET ORAL
Qty: 20 TABLET | Refills: 0 | Status: SHIPPED | OUTPATIENT
Start: 2022-11-21

## 2022-12-01 ENCOUNTER — TELEPHONE (OUTPATIENT)
Dept: INTERNAL MEDICINE CLINIC | Facility: CLINIC | Age: 62
End: 2022-12-01

## 2022-12-01 DIAGNOSIS — R20.2 TINGLING SENSATION: Primary | ICD-10-CM

## 2022-12-01 NOTE — TELEPHONE ENCOUNTER
Pt was seen 11/21 for finger numbness. Pt stated she was given prednisone for this however no improvement has been made. Please advise for further recommendations.

## 2023-01-16 RX ORDER — HYDROCHLOROTHIAZIDE 12.5 MG/1
12.5 TABLET ORAL
Qty: 90 TABLET | Refills: 0 | Status: SHIPPED | OUTPATIENT
Start: 2023-01-16

## 2023-01-16 NOTE — TELEPHONE ENCOUNTER
Refill request for hydroCHLOROthiazide 12.5 MG Oral Tab.     [address] [facility], [address] [facility] [phone], [address] [place] [address]   Phone: [phone] Fax: [phone]

## 2023-03-13 RX ORDER — PRAVASTATIN SODIUM 40 MG
TABLET ORAL
Qty: 90 TABLET | Refills: 0 | Status: SHIPPED | OUTPATIENT
Start: 2023-03-13

## 2023-03-30 ENCOUNTER — HOSPITAL ENCOUNTER (OUTPATIENT)
Dept: MAMMOGRAPHY | Facility: HOSPITAL | Age: 63
Discharge: HOME | End: 2023-03-30
Attending: FAMILY MEDICINE
Payer: COMMERCIAL

## 2023-03-30 DIAGNOSIS — R92.8 ABNORMAL MAMMOGRAM: ICD-10-CM

## 2023-03-30 PROCEDURE — 76642 ULTRASOUND BREAST LIMITED: CPT | Performed by: FAMILY MEDICINE

## 2023-04-03 ENCOUNTER — OFFICE VISIT (OUTPATIENT)
Dept: ELECTROPHYSIOLOGY | Facility: HOSPITAL | Age: 63
End: 2023-04-03
Attending: FAMILY MEDICINE
Payer: COMMERCIAL

## 2023-04-03 DIAGNOSIS — R20.2 TINGLING SENSATION: ICD-10-CM

## 2023-04-03 PROCEDURE — 95886 MUSC TEST DONE W/N TEST COMP: CPT

## 2023-04-03 PROCEDURE — 95911 NRV CNDJ TEST 9-10 STUDIES: CPT

## 2023-04-10 RX ORDER — HYDROCHLOROTHIAZIDE 12.5 MG/1
TABLET ORAL
Qty: 90 TABLET | Refills: 0 | Status: SHIPPED | OUTPATIENT
Start: 2023-04-10

## 2023-04-20 ENCOUNTER — TELEPHONE (OUTPATIENT)
Dept: INTERNAL MEDICINE CLINIC | Facility: CLINIC | Age: 63
End: 2023-04-20

## 2023-04-20 RX ORDER — GABAPENTIN 100 MG/1
100 CAPSULE ORAL 2 TIMES DAILY
Qty: 60 CAPSULE | Refills: 0 | Status: SHIPPED | OUTPATIENT
Start: 2023-04-20

## 2023-04-20 NOTE — TELEPHONE ENCOUNTER
Spoke with pt. She feels some days are worse than others, and now she feels neuropathy in her feet. Pt stated she is ready to start a medication. Pt stated she just found out father had neuropathy also. Please advise on starting pt on med based off EMG results?

## 2023-04-20 NOTE — TELEPHONE ENCOUNTER
Ordered medication. LMTCB for pt on Home # as that is her preference to notify to start it and update in one month.

## 2023-05-22 ENCOUNTER — TELEPHONE (OUTPATIENT)
Dept: INTERNAL MEDICINE CLINIC | Facility: CLINIC | Age: 63
End: 2023-05-22

## 2023-05-22 NOTE — TELEPHONE ENCOUNTER
Patient called office to share 1-month update:     -since starting gabapentin 4/20, she has seen \"a bit of improvement in the numbness/tingling to right hand 1st/2nd digits, but has also developed right knee pain/ right foot aches\". -patient states \"I am not sure if I am better because of the medication or simply because of the improvement in the weather\"      TO- patient would like to stop gabapentin for 1 month and see if knee/foot issues resolve and see if fingers continue to feel better w/o medication. Patient asking if you are supportive of her plan. Please advise. TY!
Spoke with patient via phone. Shared with patient TO's support of plan she proposed. Advised patient to call office in 1 month with update, sooner if any problems develop. Patient verbalized understanding. Denies further questions.
DISPLAY PLAN FREE TEXT

## 2023-06-14 ENCOUNTER — OFFICE VISIT (OUTPATIENT)
Dept: INTERNAL MEDICINE CLINIC | Facility: CLINIC | Age: 63
End: 2023-06-14
Payer: COMMERCIAL

## 2023-06-14 VITALS
TEMPERATURE: 98 F | HEART RATE: 73 BPM | HEIGHT: 65.75 IN | BODY MASS INDEX: 26.64 KG/M2 | WEIGHT: 163.81 LBS | RESPIRATION RATE: 16 BRPM | OXYGEN SATURATION: 100 % | DIASTOLIC BLOOD PRESSURE: 72 MMHG | SYSTOLIC BLOOD PRESSURE: 124 MMHG

## 2023-06-14 DIAGNOSIS — E03.9 HYPOTHYROIDISM, UNSPECIFIED TYPE: ICD-10-CM

## 2023-06-14 DIAGNOSIS — M25.561 CHRONIC PAIN OF RIGHT KNEE: ICD-10-CM

## 2023-06-14 DIAGNOSIS — R20.2 TINGLING SENSATION: ICD-10-CM

## 2023-06-14 DIAGNOSIS — G89.29 CHRONIC PAIN OF RIGHT KNEE: ICD-10-CM

## 2023-06-14 DIAGNOSIS — Z12.31 ENCOUNTER FOR SCREENING MAMMOGRAM FOR MALIGNANT NEOPLASM OF BREAST: ICD-10-CM

## 2023-06-14 DIAGNOSIS — E78.00 PURE HYPERCHOLESTEROLEMIA: ICD-10-CM

## 2023-06-14 DIAGNOSIS — I10 ESSENTIAL HYPERTENSION, BENIGN: Primary | ICD-10-CM

## 2023-06-14 PROBLEM — Z12.11 SPECIAL SCREENING FOR MALIGNANT NEOPLASM OF COLON: Status: RESOLVED | Noted: 2019-01-28 | Resolved: 2023-06-14

## 2023-06-14 PROCEDURE — 3008F BODY MASS INDEX DOCD: CPT | Performed by: FAMILY MEDICINE

## 2023-06-14 PROCEDURE — 99214 OFFICE O/P EST MOD 30 MIN: CPT | Performed by: FAMILY MEDICINE

## 2023-06-14 PROCEDURE — 3074F SYST BP LT 130 MM HG: CPT | Performed by: FAMILY MEDICINE

## 2023-06-14 PROCEDURE — 3078F DIAST BP <80 MM HG: CPT | Performed by: FAMILY MEDICINE

## 2023-06-14 RX ORDER — PRAVASTATIN SODIUM 40 MG
40 TABLET ORAL DAILY
Qty: 90 TABLET | Refills: 1 | Status: SHIPPED | OUTPATIENT
Start: 2023-06-14

## 2023-06-14 RX ORDER — HYDROCHLOROTHIAZIDE 12.5 MG/1
12.5 TABLET ORAL DAILY
Qty: 90 TABLET | Refills: 1 | Status: SHIPPED | OUTPATIENT
Start: 2023-06-14

## 2023-06-14 RX ORDER — IRBESARTAN 300 MG/1
300 TABLET ORAL
Qty: 90 TABLET | Refills: 1 | Status: SHIPPED | OUTPATIENT
Start: 2023-06-14

## 2023-06-15 LAB
ALBUMIN/GLOBULIN RATIO: 2.2 (CALC) (ref 1–2.5)
ALBUMIN: 4.6 G/DL (ref 3.6–5.1)
ALKALINE PHOSPHATASE: 69 U/L (ref 37–153)
ALT: 19 U/L (ref 6–29)
AST: 15 U/L (ref 10–35)
BILIRUBIN, TOTAL: 0.5 MG/DL (ref 0.2–1.2)
BUN: 15 MG/DL (ref 7–25)
CALCIUM: 9.7 MG/DL (ref 8.6–10.4)
CARBON DIOXIDE: 29 MMOL/L (ref 20–32)
CHLORIDE: 100 MMOL/L (ref 98–110)
CHOL/HDLC RATIO: 3.4 (CALC)
CHOLESTEROL, TOTAL: 180 MG/DL
CREATININE: 0.79 MG/DL (ref 0.5–1.05)
EGFR: 83 ML/MIN/1.73M2
GLOBULIN: 2.1 G/DL (CALC) (ref 1.9–3.7)
GLUCOSE: 94 MG/DL (ref 65–99)
HDL CHOLESTEROL: 53 MG/DL
LDL-CHOLESTEROL: 105 MG/DL (CALC)
NON-HDL CHOLESTEROL: 127 MG/DL (CALC)
POTASSIUM: 4.1 MMOL/L (ref 3.5–5.3)
PROTEIN, TOTAL: 6.7 G/DL (ref 6.1–8.1)
SODIUM: 137 MMOL/L (ref 135–146)
T4, FREE: 1.3 NG/DL (ref 0.8–1.8)
TRIGLYCERIDES: 128 MG/DL
TSH: 3.45 MIU/L (ref 0.4–4.5)

## 2023-07-10 RX ORDER — LEVOTHYROXINE SODIUM 112 UG/1
TABLET ORAL
Qty: 90 TABLET | Refills: 0 | Status: SHIPPED | OUTPATIENT
Start: 2023-07-10

## 2023-07-10 NOTE — TELEPHONE ENCOUNTER
Requesting    Name from pharmacy: Levothyroxine Sodium 112 MCG Oral Tablet         Will file in chart as: LEVOTHYROXINE 112 MCG Oral Tab    Sig: Take 1 tablet by mouth once daily    Disp: 90 tablet    Refills: 0    Start: 7/8/2023    Class: Normal    Non-formulary    Last ordered: 11 months ago by Alessio Danielle MD Last refill: 11/4/2022    Rx #: [id number]    Thyroid Supplements Protocol [id number]/08/2023 12:16 AM   Protocol Details TSH test in past 12 months    TSH value between 0.350 and 5.500 IU/ml    Appointment in past 12 or next 3 months        LOV: 6/14/2023  RTC: none noted  Last Relevant Labs: 6/14/2023  Filled: 8/10/2022 #90 with 1 refills    No future appointments.

## 2023-09-06 ENCOUNTER — OFFICE VISIT (OUTPATIENT)
Dept: INTERNAL MEDICINE CLINIC | Facility: CLINIC | Age: 63
End: 2023-09-06
Payer: MEDICARE

## 2023-09-06 VITALS
TEMPERATURE: 98 F | OXYGEN SATURATION: 98 % | HEART RATE: 64 BPM | SYSTOLIC BLOOD PRESSURE: 132 MMHG | BODY MASS INDEX: 26.9 KG/M2 | WEIGHT: 165.38 LBS | DIASTOLIC BLOOD PRESSURE: 86 MMHG | HEIGHT: 65.75 IN

## 2023-09-06 DIAGNOSIS — M25.562 CHRONIC PAIN OF BOTH KNEES: ICD-10-CM

## 2023-09-06 DIAGNOSIS — E78.00 PURE HYPERCHOLESTEROLEMIA: ICD-10-CM

## 2023-09-06 DIAGNOSIS — I83.93 ASYMPTOMATIC VARICOSE VEINS OF BOTH LOWER EXTREMITIES: Primary | ICD-10-CM

## 2023-09-06 DIAGNOSIS — I10 ESSENTIAL HYPERTENSION, BENIGN: ICD-10-CM

## 2023-09-06 DIAGNOSIS — Z00.00 ENCOUNTER FOR ANNUAL HEALTH EXAMINATION: ICD-10-CM

## 2023-09-06 DIAGNOSIS — Z71.85 VACCINE COUNSELING: ICD-10-CM

## 2023-09-06 DIAGNOSIS — E55.9 VITAMIN D DEFICIENCY: ICD-10-CM

## 2023-09-06 DIAGNOSIS — Z12.31 VISIT FOR SCREENING MAMMOGRAM: ICD-10-CM

## 2023-09-06 DIAGNOSIS — Z78.0 POST-MENOPAUSAL: ICD-10-CM

## 2023-09-06 DIAGNOSIS — G89.29 CHRONIC PAIN OF BOTH KNEES: ICD-10-CM

## 2023-09-06 DIAGNOSIS — E03.9 HYPOTHYROIDISM, UNSPECIFIED TYPE: ICD-10-CM

## 2023-09-06 DIAGNOSIS — G62.9 NEUROPATHY: ICD-10-CM

## 2023-09-06 DIAGNOSIS — M43.10 ACQUIRED SPONDYLOLISTHESIS: ICD-10-CM

## 2023-09-06 DIAGNOSIS — M25.561 CHRONIC PAIN OF BOTH KNEES: ICD-10-CM

## 2023-09-06 DIAGNOSIS — H40.89 OTHER SPECIFIED GLAUCOMA, UNSPECIFIED LATERALITY: ICD-10-CM

## 2023-09-06 LAB
ATRIAL RATE: 58 BPM
P AXIS: 3 DEGREES
P-R INTERVAL: 132 MS
Q-T INTERVAL: 454 MS
QRS DURATION: 134 MS
QTC CALCULATION (BEZET): 445 MS
R AXIS: 0 DEGREES
T AXIS: 74 DEGREES
VENTRICULAR RATE: 58 BPM

## 2023-09-06 PROCEDURE — 90677 PCV20 VACCINE IM: CPT | Performed by: FAMILY MEDICINE

## 2023-09-06 PROCEDURE — G0402 INITIAL PREVENTIVE EXAM: HCPCS | Performed by: FAMILY MEDICINE

## 2023-09-06 PROCEDURE — G0009 ADMIN PNEUMOCOCCAL VACCINE: HCPCS | Performed by: FAMILY MEDICINE

## 2023-09-06 PROCEDURE — G0403 EKG FOR INITIAL PREVENT EXAM: HCPCS | Performed by: FAMILY MEDICINE

## 2023-09-06 RX ORDER — SCOLOPAMINE TRANSDERMAL SYSTEM 1 MG/1
1 PATCH, EXTENDED RELEASE TRANSDERMAL
Qty: 4 PATCH | Refills: 0 | Status: SHIPPED | OUTPATIENT
Start: 2023-09-06

## 2023-09-06 RX ORDER — IRBESARTAN 300 MG/1
300 TABLET ORAL
Qty: 90 TABLET | Refills: 1 | Status: SHIPPED | OUTPATIENT
Start: 2023-09-06

## 2023-09-06 RX ORDER — PRAVASTATIN SODIUM 40 MG
40 TABLET ORAL DAILY
Qty: 90 TABLET | Refills: 1 | Status: SHIPPED | OUTPATIENT
Start: 2023-09-06

## 2023-09-06 RX ORDER — LEVOTHYROXINE SODIUM 112 UG/1
112 TABLET ORAL DAILY
Qty: 90 TABLET | Refills: 1 | Status: SHIPPED | OUTPATIENT
Start: 2023-09-06

## 2023-09-06 RX ORDER — HYDROCHLOROTHIAZIDE 25 MG/1
25 TABLET ORAL DAILY
Qty: 90 TABLET | Refills: 1 | Status: SHIPPED | OUTPATIENT
Start: 2023-09-06

## 2023-09-06 RX ORDER — DORZOLAMIDE HYDROCHLORIDE AND TIMOLOL MALEATE PRESERVATIVE FREE 20; 5 MG/ML; MG/ML
1 SOLUTION/ DROPS OPHTHALMIC 2 TIMES DAILY
COMMUNITY
Start: 2023-09-01

## 2023-09-07 LAB
ABSOLUTE BASOPHILS: 53 CELLS/UL (ref 0–200)
ABSOLUTE EOSINOPHILS: 99 CELLS/UL (ref 15–500)
ABSOLUTE LYMPHOCYTES: 1617 CELLS/UL (ref 850–3900)
ABSOLUTE MONOCYTES: 568 CELLS/UL (ref 200–950)
ABSOLUTE NEUTROPHILS: 4264 CELLS/UL (ref 1500–7800)
ALBUMIN/GLOBULIN RATIO: 2 (CALC) (ref 1–2.5)
ALBUMIN: 4.4 G/DL (ref 3.6–5.1)
ALKALINE PHOSPHATASE: 68 U/L (ref 37–153)
ALT: 18 U/L (ref 6–29)
APPEARANCE: CLEAR
AST: 16 U/L (ref 10–35)
BASOPHILS: 0.8 %
BILIRUBIN, TOTAL: 0.6 MG/DL (ref 0.2–1.2)
BILIRUBIN: NEGATIVE
BUN: 13 MG/DL (ref 7–25)
CALCIUM: 9.5 MG/DL (ref 8.6–10.4)
CARBON DIOXIDE: 29 MMOL/L (ref 20–32)
CHLORIDE: 102 MMOL/L (ref 98–110)
CHOL/HDLC RATIO: 3.2 (CALC)
CHOLESTEROL, TOTAL: 163 MG/DL
COLOR: YELLOW
CREATININE: 0.83 MG/DL (ref 0.5–1.05)
EGFR: 78 ML/MIN/1.73M2
EOSINOPHILS: 1.5 %
GLOBULIN: 2.2 G/DL (CALC) (ref 1.9–3.7)
GLUCOSE: 91 MG/DL (ref 65–99)
GLUCOSE: NEGATIVE
HDL CHOLESTEROL: 51 MG/DL
HEMATOCRIT: 35.6 % (ref 35–45)
HEMOGLOBIN: 11.1 G/DL (ref 11.7–15.5)
KETONES: NEGATIVE
LDL-CHOLESTEROL: 88 MG/DL (CALC)
LEUKOCYTE ESTERASE: NEGATIVE
LYMPHOCYTES: 24.5 %
MCH: 25.7 PG (ref 27–33)
MCHC: 31.2 G/DL (ref 32–36)
MCV: 82.4 FL (ref 80–100)
MONOCYTES: 8.6 %
MPV: 9.5 FL (ref 7.5–12.5)
NEUTROPHILS: 64.6 %
NITRITE: NEGATIVE
NON-HDL CHOLESTEROL: 112 MG/DL (CALC)
OCCULT BLOOD: NEGATIVE
PH: 7 (ref 5–8)
PLATELET COUNT: 348 THOUSAND/UL (ref 140–400)
POTASSIUM: 4.1 MMOL/L (ref 3.5–5.3)
PROTEIN, TOTAL: 6.6 G/DL (ref 6.1–8.1)
PROTEIN: NEGATIVE
RDW: 14.1 % (ref 11–15)
RED BLOOD CELL COUNT: 4.32 MILLION/UL (ref 3.8–5.1)
SODIUM: 139 MMOL/L (ref 135–146)
SPECIFIC GRAVITY: 1.02 (ref 1–1.03)
T4, FREE: 1.4 NG/DL (ref 0.8–1.8)
TRIGLYCERIDES: 143 MG/DL
TSH: 2.33 MIU/L (ref 0.4–4.5)
VITAMIN D, 25-OH, TOTAL: 63 NG/ML (ref 30–100)
WHITE BLOOD CELL COUNT: 6.6 THOUSAND/UL (ref 3.8–10.8)

## 2023-09-08 ENCOUNTER — HOSPITAL ENCOUNTER (OUTPATIENT)
Dept: BONE DENSITY | Age: 63
Discharge: HOME | End: 2023-09-08
Attending: FAMILY MEDICINE
Payer: MEDICARE

## 2023-09-08 DIAGNOSIS — Z78.0 POST-MENOPAUSAL: ICD-10-CM

## 2023-09-08 PROCEDURE — 77080 DXA BONE DENSITY AXIAL: CPT | Performed by: FAMILY MEDICINE

## 2023-09-11 ENCOUNTER — TELEPHONE (OUTPATIENT)
Dept: INTERNAL MEDICINE CLINIC | Facility: CLINIC | Age: 63
End: 2023-09-11

## 2023-09-11 DIAGNOSIS — R19.5 DARK STOOLS: Primary | ICD-10-CM

## 2023-10-10 ENCOUNTER — TELEMEDICINE (OUTPATIENT)
Dept: INTERNAL MEDICINE CLINIC | Facility: CLINIC | Age: 63
End: 2023-10-10
Payer: MEDICARE

## 2023-10-10 DIAGNOSIS — A09 TRAVELER'S DIARRHEA: Primary | ICD-10-CM

## 2023-10-10 PROCEDURE — 99213 OFFICE O/P EST LOW 20 MIN: CPT | Performed by: FAMILY MEDICINE

## 2023-10-10 RX ORDER — CIPROFLOXACIN 500 MG/1
500 TABLET, FILM COATED ORAL 2 TIMES DAILY
Qty: 6 TABLET | Refills: 0 | Status: SHIPPED | OUTPATIENT
Start: 2023-10-10 | End: 2023-10-13

## 2023-10-10 NOTE — PROGRESS NOTES
Virtual Video Check-In     This visit is conducted using Telemedicine with live, interactive video and audio. Charly Castaneda, who has verified his/her identification by name and , verbally consents to a Virtual/Telephone Check-In visit on 10/10/23. Patient confirms that he/she is in the state of [place] at the time of service. Patient understands and accepts financial responsibility for any deductible, co-insurance and/or co-pays associated with this service. TIME: 6 minutes      HPI: The patient complaints of diarrhea. Has had for 3 days. Stool is described as loose mud Wednesday thru Friday and turned more liquid on Saturday. Pt denies blood or mucus in the stools. Patient has not had any blood or mucus in the stool. Patient no cramping/pain. Denies any associated nausea or vomiting. Patient has been urinating, Appetite is ok. Patient was on a cruise last week to [place], [place] and [place]. Pt feels it was something she ate but is not sure. Covid test negative    ROS:  GENERAL:  Denies fever, chills,weight change, fatigue  SKIN: Denies rashes, skin lesions  EYES: Denies blurred vision or double vision  HENT: Denies congestion,sore throat or ear pain. CHEST: Denies chest pain, or palpitations  LUNGS: Denies shortness of breath,wheezing or cough  GI:see HPI  MUSCULOSKELETAL: Denies any arthralgia,myalgias or swollen joints  LYMPH:  Denies lymphadenopathy  NEURO:  Denies headaches and lightheadedness. PSYCH: denies depression or anxiety    ALLERGIES:    Azithromycin            DIARRHEA, PAIN  Seasonal                  Amoxicillin             ITCHING, RASH  Clindamycin             DIARRHEA    Current Outpatient Medications   Medication Sig Dispense Refill    Dorzolamide HCl-Timolol Mal PF 2-0.5 % Ophthalmic Solution Place 1 drop into the left eye 2 (two) times daily. hydroCHLOROthiazide 25 MG Oral Tab Take 1 tablet (25 mg total) by mouth daily.  90 tablet 1    Irbesartan 300 MG Oral Tab Take 1 tablet (300 mg total) by mouth once daily. 90 tablet 1    pravastatin 40 MG Oral Tab Take 1 tablet (40 mg total) by mouth daily. 90 tablet 1    levothyroxine 112 MCG Oral Tab Take 1 tablet (112 mcg total) by mouth daily. 90 tablet 1    Scopolamine 1.5mg TD patch 1mg/3days Place 1 patch onto the skin every third day. 4 patch 0    fluticasone propionate 50 MCG/ACT Nasal [place] 2 sprays by Nasal route daily. (Patient taking differently: 2 sprays by Nasal route daily as needed.) 48 g 0    latanoprost 0.005 % Ophthalmic Solution Place 1 drop into both eyes nightly. TAKE AT BEDTIME      triamcinolone acetonide 0.1 % External Cream Apply topically 2 (two) times daily as needed. 60 g 3    aspirin 81 MG Oral Tab Take 1 tablet (81 mg total) by mouth every other day. Multiple Vitamins-Minerals (MULTIVITAL) Oral Tab Take 1 tablet by mouth daily. Cholecalciferol (VITAMIN D) 1000 UNITS Oral Cap Take  by mouth every other day. Past Medical History:   Diagnosis Date    Allergic rhinitis     Body piercing     Decorative tattoo     Essential hypertension     Flatulence/gas pain/belching     High cholesterol     Hyperlipidemia     Hypothyroidism     Leaking of urine     Osteoarthrosis, unspecified whether generalized or localized, lower leg     Pain in joints     Pure hypercholesterolemia 01/19/2013    Rash     Wears glasses       Past Surgical History:   Procedure Laterality Date    COLONOSCOPY  12/2008    repeat in 10 years    COLONOSCOPY  at 46 /58    GLAUCOMA SURGERY  08/11/2021    02/10/2022  Laser sugery    HYSTERECTOMY  2010    total hystero    KNEE REPLACEMENT SURGERY  9/18/2020    left knee    LAPAROSCOPY,DIAGNOSTIC      OOPHORECTOMY Bilateral 2010    total hystero.     OTHER SURGICAL HISTORY  09/2016    L foot sx     OTHER SURGICAL HISTORY Left     Dr Omer Buitrago   May 2019    TOTAL ABDOM HYSTERECTOMY      w/rmoval of both ovaries d/t fibroids      Social History     Socioeconomic History    Marital status: Single   Tobacco Use    Smoking status: Former     Years: 8     Types: Cigarettes     Quit date:      Years since quittin.8    Smokeless tobacco: Never   Vaping Use    Vaping Use: Never used   Substance and Sexual Activity    Alcohol use: Yes     Comment: 1 drink / month    Drug use: No   Other Topics Concern    Caffeine Concern No    Exercise Yes     Comment: stationary bike    Seat Belt Yes    Special Diet No    Stress Concern No    Weight Concern Yes       Family History   Problem Relation Age of Onset    Cancer Brother         prostate    Prostate Cancer Brother     Other (diabetes mellitus) Brother     Ovarian Cancer Paternal Grandmother 48        In her 52's. Other (lanyngeal cancer) Paternal Grandmother     Other (ovarian cancer) Paternal Grandmother     Heart Attack Maternal Grandfather     Heart Disorder Maternal Grandfather     Stroke Maternal Grandfather     Other (cad) Brother     Other (cad) Father     Other (hyperlipidemia) Father     Cancer Father         throat    Heart Disorder Father     Thyroid Disorder Mother     Hypertension Mother     Other (hyperlipidemia) Mother     Other (HTN) Mother     Other (hyperlipidemia) Maternal Grandmother     Thyroid Disorder Sister     Heart Attack Paternal Grandfather     Stroke Paternal Grandfather     Heart Disorder Paternal Grandfather     Cancer Brother     Diabetes Brother     Heart Disorder Brother     Obesity Brother     Heart Disorder Brother     Obesity Sister         PE:  No vital signs were taken for this video visit.   GENERAL: well developed, well nourished, in no acute distress  SKIN: no rashes on visible parts of skin  HEENT: normocephalic, atraumatic, conjunctiva clear  LUNGS: regular breathing rate and effort with no audible respiratory distress  NEURO: A&Ox3  PSYCH: pleasant mood and affect, appropriate judgement and insight    ASSESSMENT/PLAN:  Traveler's diarrhea  (primary encounter diagnosis)    No orders of the defined types were placed in this encounter. Meds & Refills for this Visit:  Requested Prescriptions     Signed Prescriptions Disp Refills    ciprofloxacin 500 MG Oral Tab 6 tablet 0     Sig: Take 1 tablet (500 mg total) by mouth 2 (two) times daily for 3 days. Imaging & Consults:  None  Follow BRAT/[place] diet till symptoms resolved, usually in 2-3 days, which includes bananas, rice, applesauce, and toast/dry cereal such as cherrios. Avoid milk products until symptoms resolve. It is best to drink Gatorade or Pedialyte while still having diarrhea. Resume normal diet as tolerated. To Emergency Room if any worsening of condition, persistent  diarrhea, any blood in stool , if any fever, abdominal or flank pain develops, if you have any difficulty urinating or having bowel movement ,or any other new or concerning symptoms      The patient indicates understanding of these issues and agrees to the plan. The patient is asked to call if still having symptoms. *Please note that the following visit was completed using two-way, real-time interactive audio and/or video communication. This has been done in good umang to provide continuity of care in the best interest of the provider-patient relationship, due to the ongoing public health crisis/national emergency and because of restrictions of visitation. There are limitations of this visit as physical exam could not be fully performed. Every conscious effort was taken to allow for sufficient and adequate time. Included in this visit, time may have been spent reviewing labs, medications, radiology tests and decision-making. Appropriate medical decision-making and tests are ordered as detailed in the plan of care above. Coding/billing information is submitted for this visit based on complexity of care and/or time spent for the visit.       Start Time: 12:16 pm  End Time: 12:22 pm    Anurag GARIBAY

## 2023-10-14 ENCOUNTER — HOSPITAL ENCOUNTER (OUTPATIENT)
Dept: MAMMOGRAPHY | Facility: HOSPITAL | Age: 63
Discharge: HOME | End: 2023-10-14
Attending: FAMILY MEDICINE
Payer: MEDICARE

## 2023-10-14 DIAGNOSIS — Z12.31 ENCOUNTER FOR SCREENING MAMMOGRAM FOR MALIGNANT NEOPLASM OF BREAST: ICD-10-CM

## 2023-10-14 PROCEDURE — 77067 SCR MAMMO BI INCL CAD: CPT | Performed by: FAMILY MEDICINE

## 2023-10-14 PROCEDURE — 77063 BREAST TOMOSYNTHESIS BI: CPT | Performed by: FAMILY MEDICINE

## 2023-10-17 ENCOUNTER — TELEPHONE (OUTPATIENT)
Dept: INTERNAL MEDICINE CLINIC | Facility: CLINIC | Age: 63
End: 2023-10-17

## 2023-10-17 DIAGNOSIS — R92.8 ABNORMAL MAMMOGRAM OF LEFT BREAST: Primary | ICD-10-CM

## 2023-10-17 NOTE — TELEPHONE ENCOUNTER
Due for repeat Left Breast US, order pended if appropriate.    CONCLUSION:       BI-RADS CATEGORY:   DIAGNOSTIC CATEGORY 3--PROBABLY BENIGN FINDING.       RECOMMENDATIONS:    SHORT TERM FOLLOW-UP ULTRASOUND LEFT BREAST IN 6 MONTHS.

## 2023-10-26 ENCOUNTER — TELEMEDICINE (OUTPATIENT)
Dept: INTERNAL MEDICINE CLINIC | Facility: CLINIC | Age: 63
End: 2023-10-26

## 2023-10-26 ENCOUNTER — TELEPHONE (OUTPATIENT)
Dept: INTERNAL MEDICINE CLINIC | Facility: CLINIC | Age: 63
End: 2023-10-26

## 2023-10-26 DIAGNOSIS — R09.89 PHLEGM IN THROAT: Primary | ICD-10-CM

## 2023-10-26 PROCEDURE — 99213 OFFICE O/P EST LOW 20 MIN: CPT | Performed by: FAMILY MEDICINE

## 2023-10-26 NOTE — TELEPHONE ENCOUNTER
Patient called stating she is experiencing congestion now in her throat and chest. She also says she is coughing but does not expel mucus. States she does not have any sinus congestion at this time. She has completed last round of antibiotics that were prescribed. Patient would like to know if she needs to be seen for this.

## 2023-10-26 NOTE — PROGRESS NOTES
Olivia Chery is a 70year old female. HPI:   Patient has agreed to do a [employer]ximity video encounter w me today in lieu of a regular appointment in regards to her URI     has phlegm in the troat and high in the chest    Breathing ok    feels fine in general     no nasal congestion   sleep ok        no ST  ears ok     was in [place] recently   diarrhea is better        Current Outpatient Medications   Medication Sig Dispense Refill    Cyanocobalamin (VITAMIN B12) 1000 MCG Oral Tab CR Take 1 tablet by mouth daily. Na Sulfate-K Sulfate-Mg Sulf (SUPREP BOWEL PREP KIT) 17.5-3.13-1.6 GM/177ML Oral Solution Take as directed by physician. 1 each 0    Dorzolamide HCl-Timolol Mal PF 2-0.5 % Ophthalmic Solution Place 1 drop into the left eye 2 (two) times daily. hydroCHLOROthiazide 25 MG Oral Tab Take 1 tablet (25 mg total) by mouth daily. 90 tablet 1    Irbesartan 300 MG Oral Tab Take 1 tablet (300 mg total) by mouth once daily. 90 tablet 1    pravastatin 40 MG Oral Tab Take 1 tablet (40 mg total) by mouth daily. 90 tablet 1    levothyroxine 112 MCG Oral Tab Take 1 tablet (112 mcg total) by mouth daily. 90 tablet 1    fluticasone propionate 50 MCG/ACT Nasal Suspension 2 sprays by Nasal route daily. (Patient taking differently: 2 sprays by Nasal route daily as needed.) 48 g 0    latanoprost 0.005 % Ophthalmic Solution Place 1 drop into both eyes nightly. TAKE AT BEDTIME      triamcinolone acetonide 0.1 % External Cream Apply topically 2 (two) times daily as needed. 60 g 3    aspirin 81 MG Oral Tab Take 1 tablet (81 mg total) by mouth every other day. Multiple Vitamins-Minerals (MULTIVITAL) Oral Tab Take 1 tablet by mouth daily. Cholecalciferol (VITAMIN D) 1000 UNITS Oral Cap Take  by mouth every other day.         Past Medical History:   Diagnosis Date    Allergic rhinitis     Black stools     Body piercing     Decorative tattoo     Essential hypertension     Eye disease 08/2021    Flatulence/gas pain/belching Frequent urination     High cholesterol     Hyperlipidemia     Hypothyroidism     Leaking of urine     Osteoarthrosis, unspecified whether generalized or localized, lower leg     Pain in joints     Pain with bowel movements     Pure hypercholesterolemia 2013    Rash     Wears glasses       Social History:  Social History     Socioeconomic History    Marital status: Single   Tobacco Use    Smoking status: Former     Packs/day: 1.00     Years: 8.00     Additional pack years: 0.00     Total pack years: 8.00     Types: Cigarettes     Quit date: 1984     Years since quittin.8    Smokeless tobacco: Never   Vaping Use    Vaping Use: Never used   Substance and Sexual Activity    Alcohol use: Yes     Comment: 2 drinks a month    Drug use: No   Other Topics Concern    Caffeine Concern No    Exercise Yes     Comment: stationary bike    Seat Belt Yes    Special Diet No    Stress Concern No    Weight Concern Yes        REVIEW OF SYSTEMS:   GENERAL HEALTH: feels well otherwise      EXAM:   LMP  (LMP Unknown)   GENERAL: Patient appears to be their normal self over the video encounter. Appropriate w conversation. Does not appear to be in any respiratory distress   speech is nl      ASSESSMENT AND PLAN:   (R09.89) Phlegm in throat  (primary encounter diagnosis)  Plan: not convinced she needs an abx at this time     rec flonase and benadryl for now   call w update in few days          The patient indicates understanding of these issues and agrees to the plan. Heri Collins

## 2023-10-26 NOTE — TELEPHONE ENCOUNTER
TO:   pt denies SOB  Pt stated she completed the abx, and then cough started. She did have slight nasal congestion while on ABX, but that has cleared. Pt states cough/chest congestion worse during the day. Cough does not wake her up at night. Please advise.

## 2023-10-26 NOTE — TELEPHONE ENCOUNTER
Spoke with pt. Pt states she's had chest congestion for over a week. Mild cough, some mucus coming up. No sinus/nasal congestion. Denies fever. Denies SOB/CP, just chest congestion. Feels like she can't \"kick it\"    Completed ABX for traveler's diarrhea on 10/13. Pt wondering if she should be seen for this or how long TO feels appropriate for chest congestion to clear up?

## 2023-10-27 ENCOUNTER — HOSPITAL ENCOUNTER (OUTPATIENT)
Dept: MAMMOGRAPHY | Facility: HOSPITAL | Age: 63
Discharge: HOME | End: 2023-10-27
Attending: FAMILY MEDICINE

## 2023-10-27 DIAGNOSIS — R92.8 ABNORMAL MAMMOGRAM OF LEFT BREAST: ICD-10-CM

## 2023-10-27 PROCEDURE — 76641 ULTRASOUND BREAST COMPLETE: CPT | Performed by: FAMILY MEDICINE

## 2023-10-27 PROCEDURE — 76642 ULTRASOUND BREAST LIMITED: CPT | Performed by: FAMILY MEDICINE

## 2023-11-20 RX ORDER — HYDROCHLOROTHIAZIDE 25 MG/1
25 TABLET ORAL DAILY
Qty: 90 TABLET | Refills: 1 | OUTPATIENT
Start: 2023-11-20

## 2023-11-20 RX ORDER — PRAVASTATIN SODIUM 40 MG
40 TABLET ORAL DAILY
Qty: 90 TABLET | Refills: 1 | OUTPATIENT
Start: 2023-11-20

## 2023-11-21 RX ORDER — LEVOTHYROXINE SODIUM 112 UG/1
112 TABLET ORAL DAILY
Qty: 90 TABLET | Refills: 1 | OUTPATIENT
Start: 2023-11-21

## 2023-11-21 RX ORDER — IRBESARTAN 300 MG/1
300 TABLET ORAL DAILY
Qty: 90 TABLET | Refills: 1 | OUTPATIENT
Start: 2023-11-21

## 2023-11-27 PROBLEM — K63.5 POLYP OF COLON: Status: ACTIVE | Noted: 2023-11-27

## 2023-11-27 PROBLEM — K92.1 BLOOD IN STOOL: Status: ACTIVE | Noted: 2023-11-27

## 2023-11-27 PROBLEM — D50.9 IRON DEFICIENCY ANEMIA, UNSPECIFIED: Status: ACTIVE | Noted: 2023-11-27

## 2023-11-27 PROBLEM — K92.1 MELENA: Status: ACTIVE | Noted: 2023-11-27

## 2023-11-27 PROBLEM — D50.0 IRON DEFICIENCY ANEMIA SECONDARY TO BLOOD LOSS (CHRONIC): Status: ACTIVE | Noted: 2023-11-27

## 2023-11-27 PROBLEM — R19.4 CHANGE IN BOWEL HABITS: Status: ACTIVE | Noted: 2023-11-27

## 2024-01-12 ENCOUNTER — APPOINTMENT (OUTPATIENT)
Dept: HEMATOLOGY/ONCOLOGY | Facility: HOSPITAL | Age: 64
End: 2024-01-12
Attending: NURSE PRACTITIONER
Payer: MEDICARE

## 2024-01-16 ENCOUNTER — APPOINTMENT (OUTPATIENT)
Dept: HEMATOLOGY/ONCOLOGY | Facility: HOSPITAL | Age: 64
End: 2024-01-16
Attending: NURSE PRACTITIONER
Payer: MEDICARE

## 2024-01-16 ENCOUNTER — TELEPHONE (OUTPATIENT)
Dept: HEMATOLOGY/ONCOLOGY | Facility: HOSPITAL | Age: 64
End: 2024-01-16

## 2024-02-09 ENCOUNTER — OFFICE VISIT (OUTPATIENT)
Dept: HEMATOLOGY/ONCOLOGY | Facility: HOSPITAL | Age: 64
End: 2024-02-09
Attending: NURSE PRACTITIONER
Payer: MEDICARE

## 2024-02-09 VITALS
SYSTOLIC BLOOD PRESSURE: 158 MMHG | DIASTOLIC BLOOD PRESSURE: 84 MMHG | WEIGHT: 170.81 LBS | BODY MASS INDEX: 27.45 KG/M2 | HEIGHT: 65.98 IN | OXYGEN SATURATION: 98 % | TEMPERATURE: 98 F | HEART RATE: 63 BPM | RESPIRATION RATE: 16 BRPM

## 2024-02-09 DIAGNOSIS — D50.0 IRON DEFICIENCY ANEMIA SECONDARY TO BLOOD LOSS (CHRONIC): Primary | ICD-10-CM

## 2024-02-09 DIAGNOSIS — D50.9 IRON DEFICIENCY ANEMIA, UNSPECIFIED: ICD-10-CM

## 2024-02-09 PROCEDURE — 96374 THER/PROPH/DIAG INJ IV PUSH: CPT

## 2024-02-09 RX ORDER — METHYLPREDNISOLONE SODIUM SUCCINATE 125 MG/2ML
125 INJECTION, POWDER, LYOPHILIZED, FOR SOLUTION INTRAMUSCULAR; INTRAVENOUS ONCE
OUTPATIENT
Start: 2024-03-08

## 2024-02-09 RX ORDER — MEPERIDINE HYDROCHLORIDE 25 MG/ML
25 INJECTION INTRAMUSCULAR; INTRAVENOUS; SUBCUTANEOUS ONCE
OUTPATIENT
Start: 2024-03-08

## 2024-02-09 RX ORDER — METHYLPREDNISOLONE SODIUM SUCCINATE 40 MG/ML
40 INJECTION, POWDER, LYOPHILIZED, FOR SOLUTION INTRAMUSCULAR; INTRAVENOUS ONCE
OUTPATIENT
Start: 2024-03-08

## 2024-02-09 RX ORDER — FAMOTIDINE 10 MG/ML
20 INJECTION, SOLUTION INTRAVENOUS ONCE
OUTPATIENT
Start: 2024-03-08

## 2024-02-09 RX ORDER — DIPHENHYDRAMINE HYDROCHLORIDE 50 MG/ML
25 INJECTION INTRAMUSCULAR; INTRAVENOUS ONCE
OUTPATIENT
Start: 2024-03-08

## 2024-02-09 NOTE — PROGRESS NOTES
Pt here for Venofer . Pt denies any issues or concerns.      Ordering MD: Dr. Larry  Order Exp: 4/8/24     Pt tolerated infusion without difficulty or complaint. Reviewed next apt date/time: 3/11/24 1pm      Education Record  Learner:  Patient  Disease / Diagnosis: Iron def anemia  Barriers / Limitations:  None  Method:  Brief focused and Reinforcement  General Topics:  Plan of care reviewed  Outcome:  Shows understanding

## 2024-03-11 ENCOUNTER — OFFICE VISIT (OUTPATIENT)
Dept: HEMATOLOGY/ONCOLOGY | Facility: HOSPITAL | Age: 64
End: 2024-03-11
Attending: NURSE PRACTITIONER
Payer: MEDICARE

## 2024-03-11 VITALS
SYSTOLIC BLOOD PRESSURE: 125 MMHG | RESPIRATION RATE: 16 BRPM | OXYGEN SATURATION: 96 % | TEMPERATURE: 98 F | HEART RATE: 62 BPM | DIASTOLIC BLOOD PRESSURE: 80 MMHG

## 2024-03-11 DIAGNOSIS — D50.0 IRON DEFICIENCY ANEMIA SECONDARY TO BLOOD LOSS (CHRONIC): Primary | ICD-10-CM

## 2024-03-11 DIAGNOSIS — D50.9 IRON DEFICIENCY ANEMIA, UNSPECIFIED IRON DEFICIENCY ANEMIA TYPE: ICD-10-CM

## 2024-03-11 PROCEDURE — 96374 THER/PROPH/DIAG INJ IV PUSH: CPT

## 2024-03-11 RX ORDER — METHYLPREDNISOLONE SODIUM SUCCINATE 125 MG/2ML
125 INJECTION, POWDER, LYOPHILIZED, FOR SOLUTION INTRAMUSCULAR; INTRAVENOUS ONCE
OUTPATIENT
Start: 2024-04-01

## 2024-03-11 RX ORDER — MEPERIDINE HYDROCHLORIDE 25 MG/ML
25 INJECTION INTRAMUSCULAR; INTRAVENOUS; SUBCUTANEOUS ONCE
OUTPATIENT
Start: 2024-04-01

## 2024-03-11 RX ORDER — DIPHENHYDRAMINE HYDROCHLORIDE 50 MG/ML
25 INJECTION INTRAMUSCULAR; INTRAVENOUS ONCE
OUTPATIENT
Start: 2024-04-01

## 2024-03-11 RX ORDER — METHYLPREDNISOLONE SODIUM SUCCINATE 40 MG/ML
40 INJECTION, POWDER, LYOPHILIZED, FOR SOLUTION INTRAMUSCULAR; INTRAVENOUS ONCE
OUTPATIENT
Start: 2024-04-01

## 2024-03-11 RX ORDER — FAMOTIDINE 10 MG/ML
20 INJECTION, SOLUTION INTRAVENOUS ONCE
OUTPATIENT
Start: 2024-04-01

## 2024-03-11 NOTE — PROGRESS NOTES
Pt here for iron infusion.  Arrives Ambulating independently, accompanied by self           Modifications in dose or schedule: No     Frequency of blood return and site check throughout administration: Prior to administration   Discharged to Home, Ambulating independently, accompanied by: self    Outpatient Oncology Care Plan  Problem list:  anemia  Problems related to:    disease/disease progression  Interventions:  administered iron  Expected outcomes:  optimal lab values  Progress towards outcome:  making progress    Education Record    Learner:  Patient  Barriers / Limitations:  None  Method:  Brief focused  Outcome:  Shows understanding  Comments: Pt tolerated infusion. No c/o.

## 2024-03-13 ENCOUNTER — OFFICE VISIT (OUTPATIENT)
Dept: INTERNAL MEDICINE CLINIC | Facility: CLINIC | Age: 64
End: 2024-03-13
Payer: MEDICARE

## 2024-03-13 VITALS
SYSTOLIC BLOOD PRESSURE: 130 MMHG | TEMPERATURE: 98 F | BODY MASS INDEX: 26.87 KG/M2 | DIASTOLIC BLOOD PRESSURE: 80 MMHG | WEIGHT: 167.19 LBS | HEIGHT: 66 IN

## 2024-03-13 DIAGNOSIS — E03.9 HYPOTHYROIDISM, UNSPECIFIED TYPE: ICD-10-CM

## 2024-03-13 DIAGNOSIS — I10 ESSENTIAL HYPERTENSION, BENIGN: Primary | ICD-10-CM

## 2024-03-13 DIAGNOSIS — E78.00 PURE HYPERCHOLESTEROLEMIA: ICD-10-CM

## 2024-03-13 DIAGNOSIS — D50.0 IRON DEFICIENCY ANEMIA SECONDARY TO BLOOD LOSS (CHRONIC): ICD-10-CM

## 2024-03-13 DIAGNOSIS — R20.2 FACIAL TINGLING SENSATION: ICD-10-CM

## 2024-03-13 LAB
ABSOLUTE BASOPHILS: 50 CELLS/UL (ref 0–200)
ABSOLUTE EOSINOPHILS: 88 CELLS/UL (ref 15–500)
ABSOLUTE LYMPHOCYTES: 1859 CELLS/UL (ref 850–3900)
ABSOLUTE MONOCYTES: 561 CELLS/UL (ref 200–950)
ABSOLUTE NEUTROPHILS: 3742 CELLS/UL (ref 1500–7800)
BASOPHILS: 0.8 %
EOSINOPHILS: 1.4 %
HEMATOCRIT: 45.6 % (ref 35–45)
HEMOGLOBIN: 15.5 G/DL (ref 11.7–15.5)
LYMPHOCYTES: 29.5 %
MCH: 31.3 PG (ref 27–33)
MCHC: 34 G/DL (ref 32–36)
MCV: 91.9 FL (ref 80–100)
MONOCYTES: 8.9 %
MPV: 10.1 FL (ref 7.5–12.5)
NEUTROPHILS: 59.4 %
PLATELET COUNT: 266 THOUSAND/UL (ref 140–400)
RDW: 13.6 % (ref 11–15)
RED BLOOD CELL COUNT: 4.96 MILLION/UL (ref 3.8–5.1)
WHITE BLOOD CELL COUNT: 6.3 THOUSAND/UL (ref 3.8–10.8)

## 2024-03-13 PROCEDURE — 99214 OFFICE O/P EST MOD 30 MIN: CPT | Performed by: FAMILY MEDICINE

## 2024-03-13 RX ORDER — IRBESARTAN 300 MG/1
300 TABLET ORAL
Qty: 90 TABLET | Refills: 1 | Status: SHIPPED | OUTPATIENT
Start: 2024-03-13

## 2024-03-13 RX ORDER — LEVOTHYROXINE SODIUM 112 UG/1
112 TABLET ORAL DAILY
Qty: 90 TABLET | Refills: 1 | Status: SHIPPED | OUTPATIENT
Start: 2024-03-13

## 2024-03-13 RX ORDER — CEPHALEXIN 500 MG/1
CAPSULE ORAL
COMMUNITY
Start: 2023-11-17

## 2024-03-13 RX ORDER — HYDROCHLOROTHIAZIDE 25 MG/1
25 TABLET ORAL DAILY
Qty: 90 TABLET | Refills: 1 | Status: SHIPPED | OUTPATIENT
Start: 2024-03-13

## 2024-03-13 RX ORDER — PRAVASTATIN SODIUM 40 MG
40 TABLET ORAL DAILY
Qty: 90 TABLET | Refills: 1 | Status: SHIPPED | OUTPATIENT
Start: 2024-03-13

## 2024-03-13 NOTE — PROGRESS NOTES
Emily Hui is a 63 year old female.  HPI:   Here to f/u on the BP and meds    overall doing OK     Is getting iron infusion now      Had cscope and EGD in the fall       EGD was fine    Has had numbness in the lower face, perioral area and chin for several months     can last few hrs to 20min     no triggers   no weakness w swallowing or chewing     ok now   last time few d ago    no new meds        toes can tingle a bit at times   This is for several yrs    Heart OK   no CP   no HA    no nausea    new glasses in the fall          Current Outpatient Medications   Medication Sig Dispense Refill    Cyanocobalamin (VITAMIN B12) 1000 MCG Oral Tab CR Take 1 tablet by mouth daily.      Dorzolamide HCl-Timolol Mal PF 2-0.5 % Ophthalmic Solution Place 1 drop into the left eye 2 (two) times daily.      hydroCHLOROthiazide 25 MG Oral Tab Take 1 tablet (25 mg total) by mouth daily. 90 tablet 1    Irbesartan 300 MG Oral Tab Take 1 tablet (300 mg total) by mouth once daily. 90 tablet 1    pravastatin 40 MG Oral Tab Take 1 tablet (40 mg total) by mouth daily. 90 tablet 1    levothyroxine 112 MCG Oral Tab Take 1 tablet (112 mcg total) by mouth daily. 90 tablet 1    fluticasone propionate 50 MCG/ACT Nasal Suspension 2 sprays by Nasal route daily. (Patient taking differently: 2 sprays by Nasal route daily as needed.) 48 g 0    latanoprost 0.005 % Ophthalmic Solution Place 1 drop into both eyes nightly. TAKE AT BEDTIME      triamcinolone acetonide 0.1 % External Cream Apply topically 2 (two) times daily as needed. 60 g 3    aspirin 81 MG Oral Tab Take 1 tablet (81 mg total) by mouth every other day.      Multiple Vitamins-Minerals (MULTIVITAL) Oral Tab Take 1 tablet by mouth daily.      Cholecalciferol (VITAMIN D) 1000 UNITS Oral Cap Take  by mouth every other day.      cephalexin 500 MG Oral Cap TAKE 4 CAPSULES BY MOUTH 30 TO 60 MINUTES BEFORE DENTAL CARE (Patient not taking: Reported on 3/13/2024)      Na Sulfate-K Sulfate-Mg  Sulf (SUPREP BOWEL PREP KIT) 17.5-3.13-1.6 GM/177ML Oral Solution Take as directed by physician. (Patient not taking: Reported on 3/13/2024) 1 each 0      Past Medical History:   Diagnosis Date    Abdominal pain     Allergic rhinitis     Black stools     Body piercing     Change in hair     Decorative tattoo     Diarrhea, unspecified     Essential hypertension     Eye disease 2021    Feeling lonely     Flatulence/gas pain/belching     Frequent urination     Glaucoma     High cholesterol     Hyperlipidemia     Hypothyroidism     Leaking of urine     Osteoarthrosis, unspecified whether generalized or localized, lower leg     Pain in joints     Pain with bowel movements     Pure hypercholesterolemia 2013    Rash     Wears glasses       Social History:  Social History     Socioeconomic History    Marital status: Single   Tobacco Use    Smoking status: Former     Packs/day: 1.00     Years: 10.00     Additional pack years: 0.00     Total pack years: 10.00     Types: Cigarettes     Quit date:      Years since quittin.2    Smokeless tobacco: Never   Vaping Use    Vaping Use: Never used   Substance and Sexual Activity    Alcohol use: Yes     Alcohol/week: 1.0 standard drink of alcohol     Types: 1 Standard drinks or equivalent per week     Comment: 2 drinks a month    Drug use: No   Other Topics Concern    Caffeine Concern No    Exercise Yes     Comment: stationary bike    Seat Belt Yes    Special Diet No    Stress Concern No    Weight Concern Yes        REVIEW OF SYSTEMS:   GENERAL HEALTH: feels well otherwise     EXAM:   /80   Temp 98.2 °F (36.8 °C) (Temporal)   Ht 5' 6\" (1.676 m)   Wt 167 lb 3.2 oz (75.8 kg)   LMP  (LMP Unknown)   BMI 26.99 kg/m²   GENERAL: well developed, well nourished,in no apparent distress  SKIN: no rashes  NECK: supple,no adenopathy  LUNGS: clear to auscultation  CARDIO: RRR without murmur  EXTREMITIES: no edema  NEURO   CN intact    MS 5/5 in ext    DTR 2/2 in the ext      ASSESSMENT AND PLAN:   1. Essential hypertension, benign  stableBP   CPM w meds       2. Pure hypercholesterolemia  On meds     discussed last labs        3. Hypothyroidism, unspecified type  TFTs stable   on meds       4. Iron deficiency anemia  Check CBC       - CBC With Differential With Platelet    5. Facial tingling sensation  Nl exam    check MRI    is on B12 supplements   - MRI BRAIN (CPT=70551); Future     The patient indicates understanding of these issues and agrees to the plan.  .

## 2024-04-08 ENCOUNTER — OFFICE VISIT (OUTPATIENT)
Dept: HEMATOLOGY/ONCOLOGY | Facility: HOSPITAL | Age: 64
End: 2024-04-08
Attending: NURSE PRACTITIONER
Payer: MEDICARE

## 2024-04-08 VITALS
RESPIRATION RATE: 14 BRPM | BODY MASS INDEX: 27.64 KG/M2 | OXYGEN SATURATION: 95 % | HEART RATE: 62 BPM | HEIGHT: 65.98 IN | SYSTOLIC BLOOD PRESSURE: 125 MMHG | TEMPERATURE: 99 F | DIASTOLIC BLOOD PRESSURE: 81 MMHG | WEIGHT: 172 LBS

## 2024-04-08 DIAGNOSIS — D50.0 IRON DEFICIENCY ANEMIA SECONDARY TO BLOOD LOSS (CHRONIC): Primary | ICD-10-CM

## 2024-04-08 DIAGNOSIS — D50.9 IRON DEFICIENCY ANEMIA, UNSPECIFIED IRON DEFICIENCY ANEMIA TYPE: ICD-10-CM

## 2024-04-08 PROCEDURE — 96374 THER/PROPH/DIAG INJ IV PUSH: CPT

## 2024-04-08 RX ORDER — FAMOTIDINE 10 MG/ML
20 INJECTION, SOLUTION INTRAVENOUS ONCE
OUTPATIENT
Start: 2024-04-08

## 2024-04-08 RX ORDER — MEPERIDINE HYDROCHLORIDE 25 MG/ML
25 INJECTION INTRAMUSCULAR; INTRAVENOUS; SUBCUTANEOUS ONCE
OUTPATIENT
Start: 2024-04-08

## 2024-04-08 RX ORDER — METHYLPREDNISOLONE SODIUM SUCCINATE 40 MG/ML
40 INJECTION, POWDER, LYOPHILIZED, FOR SOLUTION INTRAMUSCULAR; INTRAVENOUS ONCE
OUTPATIENT
Start: 2024-04-08

## 2024-04-08 RX ORDER — DIPHENHYDRAMINE HYDROCHLORIDE 50 MG/ML
25 INJECTION INTRAMUSCULAR; INTRAVENOUS ONCE
OUTPATIENT
Start: 2024-04-08

## 2024-04-08 RX ORDER — METHYLPREDNISOLONE SODIUM SUCCINATE 125 MG/2ML
125 INJECTION, POWDER, LYOPHILIZED, FOR SOLUTION INTRAMUSCULAR; INTRAVENOUS ONCE
OUTPATIENT
Start: 2024-04-08

## 2024-04-08 NOTE — PROGRESS NOTES
Education Record    Learner:  Patient    Disease / Diagnosis: Here for IV Iron.     Barriers / Limitations:  None    Method:  Brief focused, printed material and  reinforcement    General Topics:  Plan of care reviewed    Outcome:  Shows understanding. Pt tolerated infusion well. Left in stable condition. Last appt for orders received - no further appts at this time.

## 2024-04-26 ENCOUNTER — HOSPITAL ENCOUNTER (OUTPATIENT)
Dept: MRI IMAGING | Facility: HOSPITAL | Age: 64
Discharge: HOME | End: 2024-04-26
Attending: FAMILY MEDICINE
Payer: MEDICARE

## 2024-04-26 ENCOUNTER — TELEPHONE (OUTPATIENT)
Dept: INTERNAL MEDICINE CLINIC | Facility: CLINIC | Age: 64
End: 2024-04-26

## 2024-04-26 DIAGNOSIS — R20.2 FACIAL TINGLING SENSATION: ICD-10-CM

## 2024-04-26 PROCEDURE — 70551 MRI BRAIN STEM W/O DYE: CPT | Performed by: FAMILY MEDICINE

## 2024-04-26 NOTE — TELEPHONE ENCOUNTER
----- Message from Ping Jaeger MD sent at 4/26/2024 10:17 AM CDT -----  Normal study     How are her s/s?  
JOSEPHINE sent to patient w/ results/recommendations from provider, active 4/25  
See MCM from patient. Any further recommendations?    LOV:   3/13/2024  5. Facial tingling sensation  Nl exam    check MRI    is on B12 supplements   - MRI BRAIN (CPT=70551); Future  
aj

## 2024-06-14 ENCOUNTER — TELEPHONE (OUTPATIENT)
Dept: NEUROLOGY | Facility: CLINIC | Age: 64
End: 2024-06-14

## 2024-06-14 ENCOUNTER — LAB ENCOUNTER (OUTPATIENT)
Dept: LAB | Age: 64
End: 2024-06-14
Attending: Other
Payer: MEDICARE

## 2024-06-14 ENCOUNTER — OFFICE VISIT (OUTPATIENT)
Dept: NEUROLOGY | Facility: CLINIC | Age: 64
End: 2024-06-14
Payer: MEDICARE

## 2024-06-14 VITALS
HEART RATE: 86 BPM | SYSTOLIC BLOOD PRESSURE: 132 MMHG | BODY MASS INDEX: 27 KG/M2 | DIASTOLIC BLOOD PRESSURE: 86 MMHG | RESPIRATION RATE: 16 BRPM | HEIGHT: 65.98 IN | WEIGHT: 168 LBS

## 2024-06-14 DIAGNOSIS — G60.8 SENSORY PERIPHERAL NEUROPATHY: ICD-10-CM

## 2024-06-14 DIAGNOSIS — R20.2 PARESTHESIA OF BOTH FEET: ICD-10-CM

## 2024-06-14 DIAGNOSIS — G60.8 SENSORY PERIPHERAL NEUROPATHY: Primary | ICD-10-CM

## 2024-06-14 DIAGNOSIS — R20.2 FACIAL PARESTHESIA: ICD-10-CM

## 2024-06-14 DIAGNOSIS — E83.10 DISORDER OF IRON METABOLISM, UNSPECIFIED: ICD-10-CM

## 2024-06-14 LAB
EST. AVERAGE GLUCOSE BLD GHB EST-MCNC: 117 MG/DL (ref 68–126)
HBA1C MFR BLD: 5.7 % (ref ?–5.7)
VIT B12 SERPL-MCNC: 381 PG/ML (ref 193–986)

## 2024-06-14 PROCEDURE — 99204 OFFICE O/P NEW MOD 45 MIN: CPT | Performed by: OTHER

## 2024-06-14 PROCEDURE — 84165 PROTEIN E-PHORESIS SERUM: CPT

## 2024-06-14 PROCEDURE — 82607 VITAMIN B-12: CPT | Performed by: OTHER

## 2024-06-14 PROCEDURE — 36415 COLL VENOUS BLD VENIPUNCTURE: CPT | Performed by: OTHER

## 2024-06-14 PROCEDURE — 83036 HEMOGLOBIN GLYCOSYLATED A1C: CPT | Performed by: OTHER

## 2024-06-14 PROCEDURE — 83521 IG LIGHT CHAINS FREE EACH: CPT

## 2024-06-14 PROCEDURE — 86334 IMMUNOFIX E-PHORESIS SERUM: CPT

## 2024-06-14 NOTE — PATIENT INSTRUCTIONS
Refill policies:    Allow 2-3 business days for refills; controlled substances may take longer.  Contact your pharmacy at least 5 days prior to running out of medication and have them send an electronic request or submit request through the “request refill” option in your [employer] account.  Refills are not addressed on weekends; covering physicians do not authorize routine medications on weekends.  No narcotics or controlled substances are refilled after noon on Fridays or by on call physicians.  By law, narcotics must be electronically prescribed.  A 30 day supply with no refills is the maximum allowed.  If your prescription is due for a refill, you may be due for a follow up appointment.  To best provide you care, patients receiving routine medications need to be seen at least once a year.  Patients receiving narcotic/controlled substance medications need to be seen at least once every 3 months.  In the event that your preferred pharmacy does not have the requested medication in stock (e.g. Backordered), it is your responsibility to find another pharmacy that has the requested medication available.  We will gladly send a new prescription to that pharmacy at your request.    Scheduling Tests:    If your physician has ordered radiology tests such as MRI or CT scans, please contact Central Scheduling at [phone] right away to schedule the test.  Once scheduled, the [facility] Centralized Referral Team will work with your insurance carrier to obtain pre-certification or prior authorization.  Depending on your insurance carrier, approval may take 3-10 days.  It is highly recommended patients assure they have received an authorization before having a test performed.  If test is done without insurance authorization, patient may be responsible for the entire amount billed.      Precertification and Prior Authorizations:  If your physician has recommended that you have a procedure or additional testing performed the [facility]  Centralized Referral Team will contact your insurance carrier to obtain pre-certification or prior authorization.    You are strongly encouraged to contact your insurance carrier to verify that your procedure/test has been approved and is a COVERED benefit.  Although the [facility] Centralized Referral Team does its due diligence, the insurance carrier gives the disclaimer that \"Although the procedure is authorized, this does not guarantee payment.\"    Ultimately the patient is responsible for payment.   Thank you for your understanding in this matter.  Paperwork Completion:  If you require FMLA or disability paperwork for your recovery, please make sure to either drop it off or have it faxed to our office at [phone]. Be sure the form has your name and date of birth on it.  The form will be faxed to our Forms Department and they will complete it for you.  There is a 25$ fee for all forms that need to be filled out.  Please be aware there is a 10-14 day turnaround time.  You will need to sign a release of information (DAVIS) form if your paperwork does not come with one.  You may call the Forms Department with any questions at [phone].  Their fax number is [phone].

## 2024-06-14 NOTE — TELEPHONE ENCOUNTER
HGB A1C lab order placed to be completed in 3-6 months per Dr Champion lab result note on 6/14/2024. Patient notified via result management message.

## 2024-06-14 NOTE — PROGRESS NOTES
HPI:    Patient ID: Emily Hui is a 63 year old female.  PCP: Dr Cary    HPI  Emily Hui is a 63-year-old female who presented for evaluation of facial paresthesias and paresthesias of both feet.  She reports since October she has been having random tingling sensation involving both lower face on and off without any associated pain or numbness. PCP had lab done which apparently showed low normal B12 levels.  She was started on the supplements but sees no improvement in the symptoms.  She eventually got an MRI of the brain without contrast which was negative for any acute abnormality.    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  No known history of prediabetes or diabetes.  Additionally c/o locking sensation in the right knee in the night, has osteoarthritis and gets cortisone injections.    HISTORY:  Past Medical History:    Abdominal pain    Allergic rhinitis    Black stools    Body piercing    Change in hair    Decorative tattoo    Diarrhea, unspecified    Essential hypertension    Eye disease    Feeling lonely    Flatulence/gas pain/belching    Frequent urination    Glaucoma    High cholesterol    Hyperlipidemia    Hypothyroidism    Leaking of urine    Osteoarthrosis, unspecified whether generalized or localized, lower leg    Pain in joints    Pain with bowel movements    Pure hypercholesterolemia    Rash    Wears glasses      Past Surgical History:   Procedure Laterality Date    Colonoscopy  12/2008    repeat in 10 years    Colonoscopy  at 48 /58    Glaucoma surgery  08/11/2021    02/10/2022  Laser sugery    Hysterectomy  2010    total hystero    Knee replacement surgery  9/18/2020    left knee    Laparoscopy,diagnostic      Oophorectomy Bilateral 2010    total hystero.    Other surgical history  09/2016    L foot sx     Other surgical history Left     Dr Momin   May 2019    Total abdom hysterectomy       w/rmoval of both ovaries d/t fibroids      Family History   Problem Relation Age of Onset    Other (cad) Father     Other (hyperlipidemia) Father     Cancer Father         throat    Heart Disorder Father     Colon Polyps Mother     Thyroid Disorder Mother     Hypertension Mother     Other (hyperlipidemia) Mother     Other (HTN) Mother     Other (hyperlipidemia) Maternal Grandmother     Heart Attack Maternal Grandfather     Heart Disorder Maternal Grandfather     Stroke Maternal Grandfather     Ovarian Cancer Paternal Grandmother 50        In her 50's.    Other (lanyngeal cancer) Paternal Grandmother     Other (ovarian cancer) Paternal Grandmother     Heart Attack Paternal Grandfather     Stroke Paternal Grandfather     Heart Disorder Paternal Grandfather     Thyroid Disorder Sister     Obesity Sister     Cancer Brother         prostate    Prostate Cancer Brother     Other (diabetes mellitus) Brother     Other (cad) Brother     Cancer Brother     Diabetes Brother     Heart Disorder Brother     Obesity Brother     Heart Disorder Brother     Heart Attack Brother       Social History     Socioeconomic History    Marital status: Single   Tobacco Use    Smoking status: Former     Current packs/day: 0.00     Average packs/day: 1 pack/day for 10.0 years (10.0 ttl pk-yrs)     Types: Cigarettes     Start date:      Quit date:      Years since quittin.4    Smokeless tobacco: Never   Vaping Use    Vaping status: Never Used   Substance and Sexual Activity    Alcohol use: Yes     Alcohol/week: 1.0 standard drink of alcohol     Types: 1 Standard drinks or equivalent per week     Comment: 2 drinks a month    Drug use: No   Other Topics Concern    Caffeine Concern No    Exercise Yes     Comment: stationary bike    Seat Belt Yes    Special Diet No    Stress Concern No    Weight Concern Yes        Review of Systems         Current Outpatient Medications   Medication Sig Dispense Refill    cephalexin 500 MG Oral Cap        hydroCHLOROthiazide 25 MG Oral Tab Take 1 tablet (25 mg total) by mouth daily. 90 tablet 1    Irbesartan 300 MG Oral Tab Take 1 tablet (300 mg total) by mouth once daily. 90 tablet 1    levothyroxine 112 MCG Oral Tab Take 1 tablet (112 mcg total) by mouth daily. 90 tablet 1    pravastatin 40 MG Oral Tab Take 1 tablet (40 mg total) by mouth daily. 90 tablet 1    Cyanocobalamin (VITAMIN B12) 1000 MCG Oral Tab CR Take 1 tablet by mouth daily.      Dorzolamide HCl-Timolol Mal PF 2-0.5 % Ophthalmic Solution Place 1 drop into the left eye 2 (two) times daily.      fluticasone propionate 50 MCG/ACT Nasal Suspension 2 sprays by Nasal route daily. (Patient taking differently: 2 sprays by Nasal route daily as needed.) 48 g 0    latanoprost 0.005 % Ophthalmic Solution Place 1 drop into both eyes nightly. TAKE AT BEDTIME      triamcinolone acetonide 0.1 % External Cream Apply topically 2 (two) times daily as needed. 60 g 3    aspirin 81 MG Oral Tab Take 1 tablet (81 mg total) by mouth every other day.      Multiple Vitamins-Minerals (MULTIVITAL) Oral Tab Take 1 tablet by mouth daily.      Cholecalciferol (VITAMIN D) 1000 UNITS Oral Cap Take  by mouth every other day.       Allergies:  Allergies   Allergen Reactions    Azithromycin DIARRHEA and PAIN    Seasonal     Amoxicillin ITCHING and RASH    Clindamycin DIARRHEA     PHYSICAL EXAM:   Physical Exam     Blood pressure 132/86, pulse 86, resp. rate 16, height 65.98\", weight 168 lb (76.2 kg), not currently breastfeeding.  General Appearance: Well nourished, well developed, no apparent distress.   HEENT: Normocephalic and atraumatic. Normal sclera.   Cardiovascular: Normal rate, regular rhythm and normal heart sounds.    Pulmonary/Chest: Effort normal and breath sounds normal.   Abdominal: Soft. Bowel sounds are normal.   Skin: dry, clean and intact  Ext: peripheral pulses present  Psych: normal mood and affect    Neurological:  Patient is awake, alert and oriented to person,  place and time   Normal memory, attention/concentration, speech and language.    Cranial Nerves:   II: Visual acuity: normal  III: Pupils: equal, round, reactive to light  III,IV,VI: Extra Ocular Movements: intact  V: Facial sensation: intact  VII: Facial strength: intact  VIII: Hearing: intact  IX: Palate: intact  XI: Shoulder shrug: intact  XII: Tongue movement: normal    Motor: Normal tone. Strength is  5 out of 5 in all extremities bilaterally.  DTR: present, 2+ right patellar,  1+ left patellar s/p left knee replacement  Absent achilles reflexes    Sensory: Mildly reduced sensation to pinprick in bilateral toes and moderate vibration loss in the right great toe    Coordination: Finger-to-nose test normal bilaterally without evidence of dysmetria.    Gait: normal casual gait         TESTS/IMAGING:     MRI brain wo contrast: 4/26/2024  FINDINGS:  Mild patient motion noted.     The ventricles and sulci are within normal limits.  There is no midline shift or mass effect.  The basal cisterns are patent.  The gray-white matter differentiation is intact.  The craniocervical junction is unremarkable.       There is no acute intracranial hemorrhage or extra-axial fluid collection identified.  There is no parenchymal signal abnormality identified.  No significant abnormal parenchymal gradient susceptibility. There is no restricted diffusion to suggest acute  ischemia/infarction.     Scattered mild mucosal thickening in the paranasal sinuses.  The mastoid air cells are unremarkable.  The expected major intracranial flow voids are present.                      Impression   CONCLUSION:  No acute intracranial abnormality identified.       ASSESSMENT/PLAN:       ICD-10-CM    1. Facial paresthesia  R20.2 Hemoglobin A1C      2. Sensory peripheral neuropathy  G60.8 Hemoglobin A1C     Monoclonal Protein Study     EMG ([facility] [place])     Vitamin B12      3. Paresthesia of both feet  R20.2 Hemoglobin A1C     EMG ([facility] [place])      Vitamin B12          Bilateral facial tingling sensation, non specific  No evidence of any trigeminal neuropathy   MRI brain negative  Continue observation    Paresthesia of both hands and feet, probable peripheral neuropathy  Had EMG nerve conduction study performed bilateral upper extremity last year which were mostly which demonstrated bilateral axonal polyneuropathy.  Will get EMG nerve conduction study and lower extremities  Check hemoglobin A1c and monoclonal protein studies    Low B12 levels  Continue B12 oral supplements.  Recheck levels      Follow up after the above test is completed  Thank you for allowing us to participate in your patient's care.      Nohemy Champion MD  [facility] [place]    See orders and medications filed with this encounter. The patient indicates understanding of these issues and agrees with the plan.    Meds This Visit:  Requested Prescriptions      No prescriptions requested or ordered in this encounter       Imaging & Referrals:  None     ID#[id number]

## 2024-06-18 ENCOUNTER — TELEPHONE (OUTPATIENT)
Dept: NEUROLOGY | Facility: CLINIC | Age: 64
End: 2024-06-18

## 2024-06-18 DIAGNOSIS — R73.03 PRE-DIABETES: Primary | ICD-10-CM

## 2024-06-18 NOTE — TELEPHONE ENCOUNTER
Patient notified of elevated HGB A1C and Dr Champion's recommendation of diet modification and repeat level in 3-6 months.. Lab order placed for repeat level in 3-6 months.     Nohemy Champion MD  6/14/2024 12:53 PM CDT Back to Top      HgA1c level suggestive of prediabetes  Diet modification and recheck in 3-6 months

## 2024-06-19 LAB
ALBUMIN SERPL ELPH-MCNC: 4.62 G/DL (ref 3.75–5.21)
ALBUMIN/GLOB SERPL: 1.79 {RATIO} (ref 1–2)
ALPHA1 GLOB SERPL ELPH-MCNC: 0.27 G/DL (ref 0.19–0.46)
ALPHA2 GLOB SERPL ELPH-MCNC: 0.67 G/DL (ref 0.48–1.05)
B-GLOBULIN SERPL ELPH-MCNC: 0.75 G/DL (ref 0.68–1.23)
GAMMA GLOB SERPL ELPH-MCNC: 0.89 G/DL (ref 0.62–1.7)
KAPPA LC FREE SER-MCNC: 1.26 MG/DL (ref 0.33–1.94)
KAPPA LC FREE/LAMBDA FREE SER NEPH: 1 {RATIO} (ref 0.26–1.65)
LAMBDA LC FREE SERPL-MCNC: 1.25 MG/DL (ref 0.57–2.63)
PROT SERPL-MCNC: 7.2 G/DL (ref 5.7–8.2)

## 2024-09-04 ENCOUNTER — TELEPHONE (OUTPATIENT)
Dept: NEUROLOGY | Facility: CLINIC | Age: 64
End: 2024-09-04

## 2024-09-04 NOTE — TELEPHONE ENCOUNTER
Patient advised that she has a HgbA1C ready at [facility] to have completed. She will get done before her appointment.

## 2024-09-09 ENCOUNTER — OFFICE VISIT (OUTPATIENT)
Dept: INTERNAL MEDICINE CLINIC | Facility: CLINIC | Age: 64
End: 2024-09-09
Payer: MEDICARE

## 2024-09-09 VITALS
DIASTOLIC BLOOD PRESSURE: 80 MMHG | HEART RATE: 80 BPM | HEIGHT: 65.98 IN | WEIGHT: 170.38 LBS | OXYGEN SATURATION: 98 % | TEMPERATURE: 98 F | SYSTOLIC BLOOD PRESSURE: 122 MMHG | BODY MASS INDEX: 27.38 KG/M2

## 2024-09-09 DIAGNOSIS — E03.9 HYPOTHYROIDISM, UNSPECIFIED TYPE: ICD-10-CM

## 2024-09-09 DIAGNOSIS — M25.561 CHRONIC PAIN OF BOTH KNEES: ICD-10-CM

## 2024-09-09 DIAGNOSIS — E55.9 VITAMIN D DEFICIENCY: ICD-10-CM

## 2024-09-09 DIAGNOSIS — M43.10 ACQUIRED SPONDYLOLISTHESIS: ICD-10-CM

## 2024-09-09 DIAGNOSIS — G62.9 NEUROPATHY: ICD-10-CM

## 2024-09-09 DIAGNOSIS — M25.562 CHRONIC PAIN OF BOTH KNEES: ICD-10-CM

## 2024-09-09 DIAGNOSIS — I10 ESSENTIAL HYPERTENSION, BENIGN: Primary | ICD-10-CM

## 2024-09-09 DIAGNOSIS — D50.0 IRON DEFICIENCY ANEMIA SECONDARY TO BLOOD LOSS (CHRONIC): ICD-10-CM

## 2024-09-09 DIAGNOSIS — E78.00 PURE HYPERCHOLESTEROLEMIA: ICD-10-CM

## 2024-09-09 DIAGNOSIS — Z12.31 VISIT FOR SCREENING MAMMOGRAM: ICD-10-CM

## 2024-09-09 DIAGNOSIS — G89.29 CHRONIC PAIN OF BOTH KNEES: ICD-10-CM

## 2024-09-09 DIAGNOSIS — K63.5 POLYP OF COLON, UNSPECIFIED PART OF COLON, UNSPECIFIED TYPE: ICD-10-CM

## 2024-09-09 DIAGNOSIS — T14.8XXA ABRASION OF SKIN: ICD-10-CM

## 2024-09-09 DIAGNOSIS — Z00.00 ENCOUNTER FOR ANNUAL HEALTH EXAMINATION: ICD-10-CM
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
## 2024-09-09 DIAGNOSIS — I83.93 ASYMPTOMATIC VARICOSE VEINS OF BOTH LOWER EXTREMITIES: ICD-10-CM

## 2024-09-09 PROBLEM — K92.1 BLOOD IN STOOL: Status: RESOLVED | Noted: 2023-11-27 | Resolved: 2024-09-09

## 2024-09-09 PROBLEM — K92.1 MELENA: Status: RESOLVED | Noted: 2023-11-27 | Resolved: 2024-09-09

## 2024-09-09 PROBLEM — R19.4 CHANGE IN BOWEL HABITS: Status: RESOLVED | Noted: 2023-11-27 | Resolved: 2024-09-09

## 2024-09-09 RX ORDER — HYDROCHLOROTHIAZIDE 25 MG/1
25 TABLET ORAL DAILY
Qty: 90 TABLET | Refills: 1 | Status: SHIPPED | OUTPATIENT
Start: 2024-09-09

## 2024-09-09 RX ORDER — IRBESARTAN 300 MG/1
300 TABLET ORAL
Qty: 90 TABLET | Refills: 1 | Status: SHIPPED | OUTPATIENT
Start: 2024-09-09

## 2024-09-09 RX ORDER — PRAVASTATIN SODIUM 40 MG
40 TABLET ORAL DAILY
Qty: 90 TABLET | Refills: 1 | Status: SHIPPED | OUTPATIENT
Start: 2024-09-09

## 2024-09-09 NOTE — PROGRESS NOTES
Subjective:   Emily Hui is a 64 year old female who presents for a Medicare Subsequent Annual Wellness visit (Pt already had Initial Annual Wellness) and scheduled follow up of multiple significant but stable problems.       History/Other:   Fall Risk Assessment:   She has been screened for Falls and is High Risk. Fall Prevention information provided to patient in After Visit Summary.    Have you fallen in the past year?: Yes  How many times have you fallen?: 2  Were you injured?: (P) Yes     Cognitive Assessment:   She had a completely normal cognitive assessment - see flowsheet entries     Functional Ability/Status:   Emily Hui has some abnormal functions as listed below:  She has Vision problems based on screening of functional status.       Depression Screening (PHQ):  PHQ-2 SCORE: 0  , done 9/9/2024             Advanced Directives:   She does have a Living Will but we do NOT have it on file in Epic.    She does have a POA but we do NOT have it on file in Epic.    Discussed Advance Care Planning with patient (and family/surrogate if present). Standard forms made available to patient in After Visit Summary.      Patient Active Problem List   Diagnosis    Hypothyroidism    Essential hypertension, benign    Pure hypercholesterolemia    Acquired spondylolisthesis    Vitamin D deficiency    Asymptomatic varicose veins of both lower extremities    Chronic pain of both knees    Other specified glaucoma    Neuropathy    Iron deficiency anemia    Iron deficiency anemia, unspecified    Polyp of colon     Allergies:  She is allergic to azithromycin, seasonal, amoxicillin, and clindamycin.    Current Medications:  Outpatient Medications Marked as Taking for the 9/9/24 encounter (Office Visit) with Ping Jaeger MD   Medication Sig    hydroCHLOROthiazide 25 MG Oral Tab Take 1 tablet (25 mg total) by mouth daily.    Irbesartan 300 MG Oral Tab Take 1 tablet (300 mg total) by mouth once daily.    pravastatin 40  MG Oral Tab Take 1 tablet (40 mg total) by mouth daily.       Medical History:  She  has a past medical history of Abdominal pain, Allergic rhinitis, Black stools, Body piercing, Change in hair, Decorative tattoo, Diarrhea, unspecified, Essential hypertension, Eye disease (08/2021), Feeling lonely, Flatulence/gas pain/belching, Frequent urination, Glaucoma, High cholesterol, Hyperlipidemia, Hypothyroidism, Leaking of urine, Osteoarthrosis, unspecified whether generalized or localized, lower leg, Pain in joints, Pain with bowel movements, Pure hypercholesterolemia (01/19/2013), Rash, and Wears glasses.  Surgical History:  She  has a past surgical history that includes colonoscopy (12/2008); laparoscopy,diagnostic; total abdom hysterectomy; hysterectomy (2010); oophorectomy (Bilateral, 2010); other surgical history (09/2016); other surgical history (Left); colonoscopy (at 48 /58); glaucoma surgery (08/11/2021); and knee replacement surgery (9/18/2020).   Family History:  Her family history includes Cancer in her brother, brother, and father; Colon Polyps in her mother; Diabetes in her brother; HTN in her mother; Heart Attack in her brother, maternal grandfather, and paternal grandfather; Heart Disorder in her brother, brother, father, maternal grandfather, and paternal grandfather; Hypertension in her mother; Obesity in her brother and sister; Ovarian Cancer (age of onset: 50) in her paternal grandmother; Prostate Cancer in her brother; Stroke in her maternal grandfather and paternal grandfather; Thyroid Disorder in her mother and sister; cad in her brother and father; diabetes mellitus in her brother; hyperlipidemia in her father, maternal grandmother, and mother; lanyngeal cancer in her paternal grandmother; ovarian cancer in her paternal grandmother.  Social History:  She  reports that she quit smoking about 39 years ago. Her smoking use included cigarettes. She started smoking about 49 years ago. She has a 10  pack-year smoking history. She has never used smokeless tobacco. She reports current alcohol use of about 1.0 standard drink of alcohol per week. She reports that she does not use drugs.    Tobacco:  She smoked tobacco in the past but quit greater than 12 months ago.  Social History     Tobacco Use   Smoking Status Former    Current packs/day: 0.00    Average packs/day: 1 pack/day for 10.0 years (10.0 ttl pk-yrs)    Types: Cigarettes    Start date:     Quit date:     Years since quittin.7   Smokeless Tobacco Never        CAGE Alcohol Screen:   CAGE screening score of 0 on 2024, showing low risk of alcohol abuse.      Patient Care Team:  Ping Jaeger MD as PCP - General  Layton Galicia, PT as Physical Therapist (Physical Therapy)  Lorena Auguste (Physical Therapy)  Domi Cross, PT as Physical Therapist (Physical Therapy)  Ervin Gonzalez PA-C (SURGERY, ORTHOPEDIC)  Crystal Luong, PT as Physical Therapist  Frankie Lauren, PT as Physical Therapist (Physical Therapy)  Rogers Villalobos PA (SURGERY, ORTHOPEDIC)  Dacia Larry APRN (Nurse Practitioner)    Review of Systems  GENERAL:  fell yesterday [place] [address]   scraped the L>R knee and bridge of the nose    Not sure how this happened    no LOC  no HA    has neuropathy    seeing Dr Champion      SKIN: denies rash    EYES: denies blurred vision   HEENT: denies nasal congestion, sinus pain or ST  LUNGS: denies shortness of breath with exertion  CARDIOVASCULAR: denies chest pain on exertion  GI: denies abdominal pain, denies heartburn  : denies dysuria   MUSCULOSKELETAL: denies back pain  NEURO: denies headaches   has neuropathy    on b12 daily      PSYCHE: denies depression or anxiety  HEMATOLOGIC: hx of anemia  would like iron cked     ENDOCRINE: denies thyroid history  ALL/ASTHMA: deniesasthma    Objective:   Physical Exam  General Appearance:  Alert, cooperative, no distress, appears stated age   Head:  Normocephalic, without obvious  abnormality, atraumatic   Eyes:  PERRL, conjunctiva/corneas clear, EOM's intact both eyes   Ears:  Normal TM's and external ear canals, both ears   Nose: Nares normal, septum midline,mucosa normal, no drainage or sinus tenderness   abrasion on bridge    Throat: Lips, mucosa, and tongue normal; teeth and gums normal   Neck: Supple, symmetrical, trachea midline, no adenopathy;  thyroid: not enlarged, symmetric, no tenderness/mass/nodules; no   JVD        Lungs:   Clear to auscultation bilaterally, respirations unlabored   Heart:  Regular rate and rhythm, S1 and S2 normal, no murmur, rub, or gallop   Abdomen:   Soft, non-tender, bowel sounds active all four quadrants,  no masses, no organomegaly   Pelvic: Deferred   Extremities: no edema   abrasion on L>R knee     Pulses: 2+ and symmetric   Skin:  no rashes    Lymph nodes: Cervical, supraclavicular nodes normal   Neurologic: Normal       /80   Pulse 80   Temp 97.8 °F (36.6 °C) (Temporal)   Ht 5' 5.98\" (1.676 m)   Wt 170 lb 6.4 oz (77.3 kg)   LMP  (LMP Unknown)   SpO2 98%   BMI 27.52 kg/m²  Estimated body mass index is 27.52 kg/m² as calculated from the following:    Height as of this encounter: 5' 5.98\" (1.676 m).    Weight as of this encounter: 170 lb 6.4 oz (77.3 kg).    Medicare Hearing Assessment:   Hearing Screening    Screening Method: Other         Visual Acuity:   Right Eye Visual Acuity: Corrected Right Eye Chart Acuity: 20/40   Left Eye Visual Acuity: Corrected Left Eye Chart Acuity: 20/40   Both Eyes Visual Acuity: Corrected Both Eyes Chart Acuity: 20/40            Assessment & Plan:   Emily Hui is a 64 year old female who presents for a Medicare Assessment.   1. Essential hypertension, benign  BP good   CPM w meds      - CBC With Differential With Platelet  - Comp Metabolic Panel (14)  - Lipid Panel  - Urinalysis with Culture Reflex    2. Pure hypercholesterolemia  Check lipids      - Lipid Panel  - Assay, Thyroid Stim Hormone    3.  Asymptomatic varicose veins of both lower extremities  Follow        4. Vitamin D deficiency  Smita today      - VITAMIN D, 1,25 DIHYDROXY [24529][Q]    5. Hypothyroidism, unspecified type  Check TFT's   on meds      - Assay, Thyroid Stim Hormone  - T4 FREE [866] [Q]    6. Neuropathy  Seeing neuro soon     perhaps related to her fall she had     onB12 daily      - Expanded, Low Complexity (10033)    7. Chronic pain of both knees  Has seen ortho        8. Acquired spondylolisthesis  Noted on imaging        9. Polyp of colon, unspecified part of colon, unspecified type  Cscope UTD        10. Visit for screening mammogram  MMG ordered    - [facility] GOOD 2D+3D SCREENING BILAT (CPT=77067/21916); Future    11. Abrasion of skin  D/t fall     looks clean    agree w neosporin   - Expanded, Low Complexity (59936)    12. Iron deficiency anemia secondary to blood loss (chronic)  Check CBC   check iron levels      - Iron And Tibc [E]  - Expanded, Low Complexity (86953)    13. Fall, initial encounter  Appears OK   suspect neuropathy related     Other than the abrasions, looks OK     follow for now    call if problems      - Expanded, Low Complexity (32547)    14. Encounter for annual health examination  As above       1. Essential hypertension, benign (Primary)  -     CBC With Differential With Platelet  -     Comp Metabolic Panel (14)  -     Lipid Panel  -     Urinalysis with Culture Reflex  2. Pure hypercholesterolemia  -     Lipid Panel  -     Assay, Thyroid Stim Hormone  3. Asymptomatic varicose veins of both lower extremities  4. Vitamin D deficiency  -     Vitamin D, 1,25 Dihydroxy  5. Hypothyroidism, unspecified type  -     Assay, Thyroid Stim Hormone  -     Free T4, (Free Thyroxine)  6. Neuropathy  -     Expanded, Low Complexity (12933)  7. Chronic pain of both knees  8. Acquired spondylolisthesis  Overview:  Log Date: 09/10/2012      9. Polyp of colon, unspecified part of colon, unspecified type  10. Visit for screening  mammogram  -     [facility] GOOD 2D+3D SCREENING BILAT (CPT=77067/18269); Future; Expected date: 09/09/2024  11. Abrasion of skin  -     Expanded, Low Complexity (22099)  12. Iron deficiency anemia secondary to blood loss (chronic)  -     Iron And Tibc  -     Expanded, Low Complexity (08012)  13. Fall, initial encounter  -     Expanded, Low Complexity (00570)  14. Encounter for annual health examination  Other orders  -     hydroCHLOROthiazide; Take 1 tablet (25 mg total) by mouth daily.  Dispense: 90 tablet; Refill: 1  -     Irbesartan; Take 1 tablet (300 mg total) by mouth once daily.  Dispense: 90 tablet; Refill: 1  -     Pravastatin Sodium; Take 1 tablet (40 mg total) by mouth daily.  Dispense: 90 tablet; Refill: 1    The patient indicates understanding of these issues and agrees to the plan.  Reinforced healthy diet, lifestyle, and exercise.      No follow-ups on file.     Ping Jaeger MD, 9/9/2024     Supplementary Documentation:   General Health:  In the past six months, have you lost more than 10 pounds without trying?: 2 - No  Has your appetite been poor?: No  How does the patient maintain a good energy level?: Daily Walks  How would you describe your daily physical activity?: Moderate  How would you describe your current health state?: Good  On a scale of 0 to 10, with 0 being no pain and 10 being severe pain, what is your pain level?: 1 - (Mild)  In the past six months, have you experienced urine leakage?: 1-Yes  At any time do you feel concerned for the safety/well-being of yourself and/or your children, in your home or elsewhere?: No  Have you had any immunizations at another office such as Influenza, Hepatitis B, Tetanus, or Pneumococcal?: No    Health Maintenance   Topic Date Due    COVID-19 Vaccine (6 - 2023-24 season) 09/01/2024    Annual Physical  09/06/2024    Mammogram  10/14/2024    Influenza Vaccine (1) 10/01/2024    Colorectal Cancer Screening  11/27/2033    DEXA Scan  Completed    Annual Depression  Screening  Completed    Fall Risk Screening (Annual)  Completed    Pneumococcal Vaccine: 65+ Years  Completed    Zoster Vaccines  Completed

## 2024-09-10 LAB — HEMOGLOBIN A1C: 5.8 % OF TOTAL HGB

## 2024-09-10 RX ORDER — IRBESARTAN 300 MG/1
300 TABLET ORAL DAILY
Qty: 90 TABLET | Refills: 1 | OUTPATIENT
Start: 2024-09-10

## 2024-09-10 RX ORDER — PRAVASTATIN SODIUM 40 MG
40 TABLET ORAL DAILY
Qty: 90 TABLET | Refills: 1 | OUTPATIENT
Start: 2024-09-10

## 2024-09-10 RX ORDER — LEVOTHYROXINE SODIUM 112 UG/1
112 TABLET ORAL DAILY
Qty: 90 TABLET | Refills: 0 | Status: SHIPPED | OUTPATIENT
Start: 2024-09-10

## 2024-09-10 RX ORDER — HYDROCHLOROTHIAZIDE 25 MG/1
25 TABLET ORAL DAILY
Qty: 90 TABLET | Refills: 1 | OUTPATIENT
Start: 2024-09-10

## 2024-09-10 NOTE — TELEPHONE ENCOUNTER
Hydrochlorothiazide 25, irbesartan 300 mg filled 9-9-24  Qty 90, 1 refill.    Pravastatin 40 mg filled 9-9-24, qty 90. 1 refill    Levothyroxine 112 mcg  Thyroid Medication Protocol [id number]/09/2024 06:11 PM   Protocol Details TSH in past 12 months   Filled 3-13-24  Qty 90  1 refill  Future Appointments   Date Time Provider Department Center   9/18/2024 10:20 AM Nohemy Champion MD [facility]   12/2/2024  8:45 AM Miah Bennett DO  [facility]   12/11/2024  7:30 AM Erlin Estes MD [facility] GI   LOV 9-9-24 TO  Labs none

## 2024-09-12 LAB
% SATURATION: 21 % (CALC) (ref 16–45)
ABSOLUTE BASOPHILS: 57 CELLS/UL (ref 0–200)
ABSOLUTE EOSINOPHILS: 139 CELLS/UL (ref 15–500)
ABSOLUTE LYMPHOCYTES: 1451 CELLS/UL (ref 850–3900)
ABSOLUTE MONOCYTES: 754 CELLS/UL (ref 200–950)
ABSOLUTE NEUTROPHILS: 5797 CELLS/UL (ref 1500–7800)
ALBUMIN/GLOBULIN RATIO: 1.9 (CALC) (ref 1–2.5)
ALBUMIN: 4.6 G/DL (ref 3.6–5.1)
ALKALINE PHOSPHATASE: 70 U/L (ref 37–153)
ALT: 21 U/L (ref 6–29)
AST: 17 U/L (ref 10–35)
BASOPHILS: 0.7 %
BILIRUBIN, TOTAL: 0.9 MG/DL (ref 0.2–1.2)
BUN: 14 MG/DL (ref 7–25)
CALCIUM: 9.9 MG/DL (ref 8.6–10.4)
CARBON DIOXIDE: 27 MMOL/L (ref 20–32)
CHLORIDE: 100 MMOL/L (ref 98–110)
CHOL/HDLC RATIO: 3.6 (CALC)
CHOLESTEROL, TOTAL: 182 MG/DL
CREATININE: 0.68 MG/DL (ref 0.5–1.05)
EGFR: 96 ML/MIN/1.73M2
EOSINOPHILS: 1.7 %
GLOBULIN: 2.4 G/DL (CALC) (ref 1.9–3.7)
GLUCOSE: 98 MG/DL (ref 65–99)
HDL CHOLESTEROL: 50 MG/DL
HEMATOCRIT: 48.9 % (ref 35–45)
HEMOGLOBIN: 16.2 G/DL (ref 11.7–15.5)
IRON BINDING CAPACITY: 366 MCG/DL (CALC) (ref 250–450)
IRON, TOTAL: 78 MCG/DL (ref 45–160)
LDL-CHOLESTEROL: 104 MG/DL (CALC)
LYMPHOCYTES: 17.7 %
MCH: 32.4 PG (ref 27–33)
MCHC: 33.1 G/DL (ref 32–36)
MCV: 97.8 FL (ref 80–100)
MONOCYTES: 9.2 %
MPV: 9.8 FL (ref 7.5–12.5)
NEUTROPHILS: 70.7 %
NON-HDL CHOLESTEROL: 132 MG/DL (CALC)
PLATELET COUNT: 295 THOUSAND/UL (ref 140–400)
POTASSIUM: 3.9 MMOL/L (ref 3.5–5.3)
PROTEIN, TOTAL: 7 G/DL (ref 6.1–8.1)
RDW: 12.4 % (ref 11–15)
RED BLOOD CELL COUNT: 5 MILLION/UL (ref 3.8–5.1)
SODIUM: 139 MMOL/L (ref 135–146)
T4, FREE: 1.3 NG/DL (ref 0.8–1.8)
TRIGLYCERIDES: 164 MG/DL
TSH: 3.85 MIU/L (ref 0.4–4.5)
VITAMIN D, 1,25 (OH)2,$TOTAL: 50 PG/ML (ref 18–72)
VITAMIN D2, 1,25 (OH)2: <8 PG/ML
VITAMIN D3, 1,25 (OH)2: 50 PG/ML
WHITE BLOOD CELL COUNT: 8.2 THOUSAND/UL (ref 3.8–10.8)

## 2024-09-18 ENCOUNTER — OFFICE VISIT (OUTPATIENT)
Dept: NEUROLOGY | Facility: CLINIC | Age: 64
End: 2024-09-18
Payer: MEDICARE

## 2024-09-18 VITALS
WEIGHT: 168 LBS | BODY MASS INDEX: 27 KG/M2 | HEART RATE: 64 BPM | HEIGHT: 66 IN | DIASTOLIC BLOOD PRESSURE: 80 MMHG | RESPIRATION RATE: 16 BRPM | SYSTOLIC BLOOD PRESSURE: 134 MMHG | OXYGEN SATURATION: 97 %

## 2024-09-18 DIAGNOSIS — G60.8 SENSORY PERIPHERAL NEUROPATHY: Primary | ICD-10-CM

## 2024-09-18 PROCEDURE — 99213 OFFICE O/P EST LOW 20 MIN: CPT | Performed by: OTHER

## 2024-09-18 NOTE — PROGRESS NOTES
HPI:    Patient ID: Emily Hui is a 64 year old female.  PCP: Dr Cary    HPI    Patient presents today for follow-up for probable sensory peripheral neuropathy.  Symptoms remain stable reported had a fall recently, walking on street and just fell.   Intermittent gets imbalance feeling. EMG and NCS scheduled in December  HgA1c rechecked was  5.8.         Emily Hui is a 63-year-old female who presented for evaluation of facial paresthesias and paresthesias of both feet.  She reports since October she has been having random tingling sensation involving both lower face on and off without any associated pain or numbness. PCP had lab done which apparently showed low normal B12 levels.  She was started on the supplements but sees no improvement in the symptoms.  She eventually got an MRI of the brain without contrast which was negative for any acute abnormality.    She has history of tingling numbness in fingers and toes and last year had EMG and NCS done in [facility] which demonstrated BUE axonal polyneuropathy.  No known history of prediabetes or diabetes.  Additionally c/o locking sensation in the right knee in the night, has osteoarthritis and gets cortisone injections.    HISTORY:  Past Medical History:    Abdominal pain    Allergic rhinitis    Black stools    Body piercing    Change in hair    Decorative tattoo    Diarrhea, unspecified    Essential hypertension    Eye disease    Feeling lonely    Flatulence/gas pain/belching    Frequent urination    Glaucoma    High cholesterol    Hyperlipidemia    Hypothyroidism    Leaking of urine    Osteoarthrosis, unspecified whether generalized or localized, lower leg    Pain in joints    Pain with bowel movements    Pure hypercholesterolemia    Rash    Wears glasses      Past Surgical History:   Procedure Laterality Date    Colonoscopy  12/2008    repeat in 10 years    Colonoscopy  at 48 /58    Glaucoma surgery  08/11/2021    02/10/2022  Laser sugery    Hysterectomy  2010     total hystero    Knee replacement surgery  2020    left knee    Laparoscopy,diagnostic      Oophorectomy Bilateral     total hystero.    Other surgical history  2016    L foot sx     Other surgical history Left     Dr Momin   May 2019    Total abdom hysterectomy      w/rmoval of both ovaries d/t fibroids      Family History   Problem Relation Age of Onset    Other (cad) Father     Other (hyperlipidemia) Father     Cancer Father         throat    Heart Disorder Father     Colon Polyps Mother     Thyroid Disorder Mother     Hypertension Mother     Other (hyperlipidemia) Mother     Other (HTN) Mother     Other (hyperlipidemia) Maternal Grandmother     Heart Attack Maternal Grandfather     Heart Disorder Maternal Grandfather     Stroke Maternal Grandfather     Ovarian Cancer Paternal Grandmother 50        In her 50's.    Other (lanyngeal cancer) Paternal Grandmother     Other (ovarian cancer) Paternal Grandmother     Heart Attack Paternal Grandfather     Stroke Paternal Grandfather     Heart Disorder Paternal Grandfather     Thyroid Disorder Sister     Obesity Sister     Cancer Brother         prostate    Prostate Cancer Brother     Other (diabetes mellitus) Brother     Other (cad) Brother     Cancer Brother     Diabetes Brother     Heart Disorder Brother     Obesity Brother     Heart Disorder Brother     Heart Attack Brother       Social History     Socioeconomic History    Marital status: Single   Tobacco Use    Smoking status: Former     Current packs/day: 0.00     Average packs/day: 1 pack/day for 10.0 years (10.0 ttl pk-yrs)     Types: Cigarettes     Start date:      Quit date:      Years since quittin.7    Smokeless tobacco: Never   Vaping Use    Vaping status: Never Used   Substance and Sexual Activity    Alcohol use: Yes     Alcohol/week: 1.0 standard drink of alcohol     Types: 1 Standard drinks or equivalent per week     Comment: 2 drinks a month    Drug use: No   Other Topics Concern     Caffeine Concern Yes     Comment: Coffee    Exercise Yes     Comment: stationary bike    Seat Belt Yes    Special Diet No    Stress Concern No    Weight Concern Yes        Review of Systems   Constitutional: Negative.    HENT: Negative.     Eyes: Negative.    Respiratory: Negative.     Cardiovascular: Negative.    Gastrointestinal: Negative.    Endocrine: Negative.    Genitourinary: Negative.    Musculoskeletal: Negative.    Neurological:  Positive for numbness.   Hematological: Negative.    Psychiatric/Behavioral: Negative.     All other systems reviewed and are negative.           Current Outpatient Medications   Medication Sig Dispense Refill    levothyroxine 112 MCG Oral Tab Take 1 tablet (112 mcg total) by mouth daily. LABS DUE 90 tablet 0    hydroCHLOROthiazide 25 MG Oral Tab Take 1 tablet (25 mg total) by mouth daily. 90 tablet 1    Irbesartan 300 MG Oral Tab Take 1 tablet (300 mg total) by mouth once daily. 90 tablet 1    pravastatin 40 MG Oral Tab Take 1 tablet (40 mg total) by mouth daily. 90 tablet 1    Cyanocobalamin (VITAMIN B12) 1000 MCG Oral Tab CR Take 1 tablet by mouth daily.      Dorzolamide HCl-Timolol Mal PF 2-0.5 % Ophthalmic Solution Place 1 drop into the left eye 2 (two) times daily.      fluticasone propionate 50 MCG/ACT Nasal Suspension 2 sprays by Nasal route daily. (Patient taking differently: 2 sprays by Nasal route daily as needed.) 48 g 0    latanoprost 0.005 % Ophthalmic Solution Place 1 drop into both eyes nightly. TAKE AT BEDTIME      triamcinolone acetonide 0.1 % External Cream Apply topically 2 (two) times daily as needed. 60 g 3    aspirin 81 MG Oral Tab Take 1 tablet (81 mg total) by mouth every other day.      Multiple Vitamins-Minerals (MULTIVITAL) Oral Tab Take 1 tablet by mouth daily.      Cholecalciferol (VITAMIN D) 1000 UNITS Oral Cap Take  by mouth every other day.      cephalexin 500 MG Oral Cap  (Patient not taking: Reported on 9/18/2024)       Allergies:  Allergies    Allergen Reactions    Azithromycin DIARRHEA and PAIN    Seasonal     Amoxicillin ITCHING and RASH    Clindamycin DIARRHEA     PHYSICAL EXAM:   Physical Exam     Blood pressure 134/80, pulse 64, resp. rate 16, height 66\", weight 168 lb (76.2 kg), SpO2 97%, not currently breastfeeding.  General Appearance: Well nourished, well developed, no apparent distress.   HEENT: Normocephalic and atraumatic. Normal sclera.   Cardiovascular: Normal rate, regular rhythm and normal heart sounds.    Pulmonary/Chest: Effort normal and breath sounds normal.   Abdominal: Soft. Bowel sounds are normal.   Skin: dry, clean and intact  Ext: peripheral pulses present  Psych: normal mood and affect    Neurological:  Patient is awake, alert and oriented to person, place and time   Normal memory, attention/concentration, speech and language.    Cranial Nerves:   II: Visual acuity: normal  III: Pupils: equal, round, reactive to light  III,IV,VI: Extra Ocular Movements: intact  V: Facial sensation: intact  VII: Facial strength: intact  VIII: Hearing: intact  IX: Palate: intact  XI: Shoulder shrug: intact  XII: Tongue movement: normal    Motor: Normal tone. Strength is  5 out of 5 in all extremities bilaterally.  DTR: present, 2+ right patellar,  1+ left patellar s/p left knee replacement  Absent achilles reflexes    Sensory: Mildly reduced sensation to pinprick in bilateral toes and moderate vibration loss in the right great toe    Coordination: Finger-to-nose test normal bilaterally without evidence of dysmetria.    Gait: normal casual gait. Intact tandem walk         TESTS/IMAGING:     MRI brain wo contrast: 4/26/2024  FINDINGS:  Mild patient motion noted.     The ventricles and sulci are within normal limits.  There is no midline shift or mass effect.  The basal cisterns are patent.  The gray-white matter differentiation is intact.  The craniocervical junction is unremarkable.       There is no acute intracranial hemorrhage or extra-axial  fluid collection identified.  There is no parenchymal signal abnormality identified.  No significant abnormal parenchymal gradient susceptibility. There is no restricted diffusion to suggest acute  ischemia/infarction.     Scattered mild mucosal thickening in the paranasal sinuses.  The mastoid air cells are unremarkable.  The expected major intracranial flow voids are present.                      Impression   CONCLUSION:  No acute intracranial abnormality identified.       ASSESSMENT/PLAN:       ICD-10-CM    1. Sensory peripheral neuropathy  G60.8           Paresthesia of both hands and feet, probable peripheral neuropathy  Had EMG nerve conduction study performed bilateral upper extremity last year, demonstrated bilateral axonal polyneuropathy.    Obtain EMG nerve conduction study of lower extremities ( scheduled in Dec 2024)  Has prediabetes, encouraged low-carb low sugar diet and monitored hemoglobin A1c      History of low B12, recent level 381  Continue B12 oral supplements.        Follow up in about 6-12 months. we will call her with the EMG results  Thank you for allowing us to participate in your patient's care.      Nohemy Champion MD  [facility] [place]    See orders and medications filed with this encounter. The patient indicates understanding of these issues and agrees with the plan.    Meds This Visit:  Requested Prescriptions      No prescriptions requested or ordered in this encounter       Imaging & Referrals:  None     ID#[id number]

## 2024-09-18 NOTE — PATIENT INSTRUCTIONS
Refill policies:    Allow 2-3 business days for refills; controlled substances may take longer.  Contact your pharmacy at least 5 days prior to running out of medication and have them send an electronic request or submit request through the “request refill” option in your [employer] account.  Refills are not addressed on weekends; covering physicians do not authorize routine medications on weekends.  No narcotics or controlled substances are refilled after noon on Fridays or by on call physicians.  By law, narcotics must be electronically prescribed.  A 30 day supply with no refills is the maximum allowed.  If your prescription is due for a refill, you may be due for a follow up appointment.  To best provide you care, patients receiving routine medications need to be seen at least once a year.  Patients receiving narcotic/controlled substance medications need to be seen at least once every 3 months.  In the event that your preferred pharmacy does not have the requested medication in stock (e.g. Backordered), it is your responsibility to find another pharmacy that has the requested medication available.  We will gladly send a new prescription to that pharmacy at your request.    Scheduling Tests:    If your physician has ordered radiology tests such as MRI or CT scans, please contact Central Scheduling at [phone] right away to schedule the test.  Once scheduled, the [facility] Centralized Referral Team will work with your insurance carrier to obtain pre-certification or prior authorization.  Depending on your insurance carrier, approval may take 3-10 days.  It is highly recommended patients assure they have received an authorization before having a test performed.  If test is done without insurance authorization, patient may be responsible for the entire amount billed.      Precertification and Prior Authorizations:  If your physician has recommended that you have a procedure or additional testing performed the [facility]  Centralized Referral Team will contact your insurance carrier to obtain pre-certification or prior authorization.    You are strongly encouraged to contact your insurance carrier to verify that your procedure/test has been approved and is a COVERED benefit.  Although the [facility] Centralized Referral Team does its due diligence, the insurance carrier gives the disclaimer that \"Although the procedure is authorized, this does not guarantee payment.\"    Ultimately the patient is responsible for payment.   Thank you for your understanding in this matter.  Paperwork Completion:  If you require FMLA or disability paperwork for your recovery, please make sure to either drop it off or have it faxed to our office at [phone]. Be sure the form has your name and date of birth on it.  The form will be faxed to our Forms Department and they will complete it for you.  There is a 25$ fee for all forms that need to be filled out.  Please be aware there is a 10-14 day turnaround time.  You will need to sign a release of information (DAVIS) form if your paperwork does not come with one.  You may call the Forms Department with any questions at [phone].  Their fax number is [phone].

## 2024-10-10 RX ORDER — FLUTICASONE PROPIONATE 50 MCG
2 SPRAY, SUSPENSION (ML) NASAL DAILY
Qty: 48 G | Refills: 0 | Status: SHIPPED | OUTPATIENT
Start: 2024-10-10

## 2024-10-10 NOTE — TELEPHONE ENCOUNTER
Refill request on:   fluticasone propionate 50 MCG/ACT Nasal Suspension 48 g    Pharmacy: [facility] #[id number] [employer]# [phone]

## 2024-10-14 ENCOUNTER — HOSPITAL ENCOUNTER (OUTPATIENT)
Dept: MAMMOGRAPHY | Facility: HOSPITAL | Age: 64
Discharge: HOME | End: 2024-10-14
Attending: FAMILY MEDICINE
Payer: MEDICARE

## 2024-10-14 DIAGNOSIS — Z12.31 VISIT FOR SCREENING MAMMOGRAM: ICD-10-CM

## 2024-10-14 PROCEDURE — 77067 SCR MAMMO BI INCL CAD: CPT | Performed by: FAMILY MEDICINE

## 2024-10-14 PROCEDURE — 77063 BREAST TOMOSYNTHESIS BI: CPT | Performed by: FAMILY MEDICINE

## 2024-12-02 ENCOUNTER — OFFICE VISIT (OUTPATIENT)
Dept: ELECTROPHYSIOLOGY | Facility: HOSPITAL | Age: 64
End: 2024-12-02
Attending: Other
Payer: MEDICARE

## 2024-12-02 DIAGNOSIS — G60.8 SENSORY PERIPHERAL NEUROPATHY: ICD-10-CM

## 2024-12-02 DIAGNOSIS — R20.2 PARESTHESIA OF BOTH FEET: ICD-10-CM

## 2024-12-02 PROCEDURE — 95910 NRV CNDJ TEST 7-8 STUDIES: CPT | Performed by: OTHER

## 2024-12-02 PROCEDURE — 95886 MUSC TEST DONE W/N TEST COMP: CPT | Performed by: OTHER

## 2024-12-02 NOTE — PROCEDURES
[facility][note 3] SERVICES  [address] [place] [id number]  [phone]            Patient: Emily Hui Gender: Female  Patient ID:  [id number]    YOB: 1960      Visit Date: 12/2/2024 7:58 AM  Patient Age On Visit Date: 64 Years      Sensory NCS      Nerve / Sites Rec. Site Onset Lat Peak Lat Ref. NP Amp Ref. PP Amp Ref. SPAR Ref. Segments Distance Onset Solo     ms ms ms µV µV µV µV % %  cm m/s   R Sural      Calf Lat Mall 2.15 2.96 <=4.40 5.5 >=5.0 11.4 >=5.0  >=40.00 Calf - Lat Mall 14 65.2         Motor NCS      Nerve / Sites Rec. Site Lat Ref. Amp Ref. Seq Amp Ref. SPAR Ref. Segments Dist. Solo Ref.     ms ms mV mV % % % %  cm m/s m/s   R Peroneal - EDB      Ankle EDB NR <=6.20 NR >=2.0 NR  NR >=50.00 Ankle - EDB 8 NR    L Peroneal - EDB      Ankle EDB NR <=6.20 NR >=2.0 NR  NR >=50.00 Ankle - EDB 8 NR    R Peroneal - Tib Ant      Fib Head Tib Ant 2.96  5.6  100  100 >=50.00 Fib Head - Tib Ant 11 37       Knee Tib Ant 4.94  6.1  109 <=115 81.6  Knee - Fib Head 11 56 >=39   L Peroneal - Tib Ant      Fib Head Tib Ant 3.83  5.3  100  96.3 >=50.00 Fib Head - Tib Ant 10 26       Knee Tib Ant 5.02  7.5  139 <=115 100  Knee - Fib Head 11 93 >=39   L Tibial - AH      Ankle AH 5.77 <=6.00 9.3 >=3.0 100  100 >=50.00 Ankle - AH 8 14       Knee AH 13.29  7.8  84 <=115 100  Knee - Ankle 34 45 >=39   R Tibial - AH      Ankle AH 5.85 <=6.00 7.9 >=3.0 100  84.3 >=50.00 Ankle - AH 8 14       Knee AH 12.13  4.8  61.5 >=50 61.7  Knee - Ankle 33 53 >=39                   EMG Summary Table     Spontaneous MUAP Recruitment   Muscle Nerve Roots IA Fib PSW Fasc H.F. Amp Dur. PPP Pattern   R. Tibialis anterior Deep peroneal (Fibular) L4-L5 N None None None None N N N N   R. Gastrocnemius (Medial head) Tibial S1-S2 N None None None None N N N N   R. Peroneus longus Peroneal L5-S1 N None None None None N N N N   R. Extensor hallucis longus Deep peroneal (Fibular) L5-S1 N None None None None N N N  N   R. Vastus medialis Femoral L2-L4 N None None None None N N N N         Summary:  The R sural SNAP was normal. The L sural SNAP was recorded as normal but due to technical error wave form and information not saved to the chart.    The bilateral peroneal motor responses recorded at the EDBs was absent, The peroneal motor response recorded at the Tas were normal. Bilateral tibial motor responses were normal.    The needle study of selected muscles of the RLE (L3-S1 myotomes) was normal.          Conclusion:   This is a normal study. The absent peroneal motor responses recorded at the EDBs was likely due to technical error due to the atrophy of the EDBs. The peroneal motor responses recorded at the Tas were normal and there was no evidence of any denervation of any peroneal enervated muscles. There is no electrodiagnostic evidence of a diffuse large fibre polyneuropathy in the BLE or a R lumbar radiculopathy at this time          Houston Bennett D.O.  Neurology

## 2024-12-05 ENCOUNTER — TELEPHONE (OUTPATIENT)
Dept: NEUROLOGY | Facility: CLINIC | Age: 64
End: 2024-12-05

## 2024-12-08 RX ORDER — LEVOTHYROXINE SODIUM 112 UG/1
112 TABLET ORAL
Qty: 90 TABLET | Refills: 1 | Status: SHIPPED | OUTPATIENT
Start: 2024-12-08

## 2024-12-12 PROBLEM — K31.A0 GASTRIC INTESTINAL METAPLASIA, UNSPECIFIED: Status: ACTIVE | Noted: 2024-12-12

## 2025-01-08 RX ORDER — FLUTICASONE PROPIONATE 50 MCG
2 SPRAY, SUSPENSION (ML) NASAL DAILY
Qty: 48 G | Refills: 0 | Status: SHIPPED | OUTPATIENT
Start: 2025-01-08

## 2025-01-08 NOTE — TELEPHONE ENCOUNTER
Requesting   Name from pharmacy: FLUTICASONE 50MCG NASAL SP (120) RX          Will file in chart as: FLUTICASONE PROPIONATE 50 MCG/ACT Nasal Suspension    Sig: SHAKE LIQUID AND USE 2 SPRAYS IN EACH NOSTRIL DAILY    Disp: 48 g    Refills: 0 (Pharmacy requested: Not specified)    Start: 1/8/2025    Class: Normal    Non-formulary    Last ordered: 3 months ago (10/10/2024) by Ping Jaeger MD    Last refill: 10/10/2024    Rx #: [id number]    Allergy Medication Protocol [id number]/08/2025 12:09 PM   Protocol Details In person appointment or virtual visit in the past 12 mos or appointment in next 3 mos    Medication is active on med list        LOV: 9/9/2024  RTC: none noted   Last Relevant Labs: n/a   Filled: 10/10/2024 #48 g with 0 refills    No future appointments.

## 2025-01-13 NOTE — TELEPHONE ENCOUNTER
Future Appointments   Date Time Provider Radha Castro   1/19/2022 10:30 AM Mariely Tafoya MD EM[facility] 8 [facility] [place] Patient

## 2025-03-07 ENCOUNTER — OFFICE VISIT (OUTPATIENT)
Dept: INTERNAL MEDICINE CLINIC | Facility: CLINIC | Age: 65
End: 2025-03-07
Payer: MEDICARE

## 2025-03-07 VITALS
HEART RATE: 59 BPM | OXYGEN SATURATION: 94 % | TEMPERATURE: 98 F | DIASTOLIC BLOOD PRESSURE: 80 MMHG | WEIGHT: 170.81 LBS | SYSTOLIC BLOOD PRESSURE: 130 MMHG | BODY MASS INDEX: 27.45 KG/M2 | HEIGHT: 66 IN

## 2025-03-07 DIAGNOSIS — L65.9 HAIR LOSS: ICD-10-CM

## 2025-03-07 DIAGNOSIS — E78.00 PURE HYPERCHOLESTEROLEMIA: ICD-10-CM

## 2025-03-07 DIAGNOSIS — M25.562 CHRONIC PAIN OF BOTH KNEES: ICD-10-CM

## 2025-03-07 DIAGNOSIS — E03.9 HYPOTHYROIDISM, UNSPECIFIED TYPE: ICD-10-CM

## 2025-03-07 DIAGNOSIS — G89.29 CHRONIC PAIN OF BOTH KNEES: ICD-10-CM

## 2025-03-07 DIAGNOSIS — I10 ESSENTIAL HYPERTENSION, BENIGN: Primary | ICD-10-CM

## 2025-03-07 DIAGNOSIS — M25.561 CHRONIC PAIN OF BOTH KNEES: ICD-10-CM

## 2025-03-07 PROCEDURE — 99214 OFFICE O/P EST MOD 30 MIN: CPT | Performed by: FAMILY MEDICINE

## 2025-03-07 PROCEDURE — G2211 COMPLEX E/M VISIT ADD ON: HCPCS | Performed by: FAMILY MEDICINE

## 2025-03-07 RX ORDER — HYDROCHLOROTHIAZIDE 25 MG/1
25 TABLET ORAL DAILY
Qty: 90 TABLET | Refills: 1 | Status: SHIPPED | OUTPATIENT
Start: 2025-03-07

## 2025-03-07 RX ORDER — LEVOTHYROXINE SODIUM 112 UG/1
112 TABLET ORAL
Qty: 90 TABLET | Refills: 1 | Status: SHIPPED | OUTPATIENT
Start: 2025-03-07

## 2025-03-07 RX ORDER — IRBESARTAN 300 MG/1
300 TABLET ORAL
Qty: 90 TABLET | Refills: 1 | Status: SHIPPED | OUTPATIENT
Start: 2025-03-07

## 2025-03-07 RX ORDER — PRAVASTATIN SODIUM 40 MG
40 TABLET ORAL DAILY
Qty: 90 TABLET | Refills: 1 | Status: SHIPPED | OUTPATIENT
Start: 2025-03-07

## 2025-03-07 NOTE — PROGRESS NOTES
Emily Hui is a 64 year old female.  HPI:   Here for f/u on her meds   overall doing OK   take meds as directed   No issues w them    Did have Covid in Dec   resolved     has had hair thinning since that time but seems better in the last 2 weeks       Does water aerobic 3 times a week or do    Breathing is good   no CP    Had recent EGD     Seeing ortho for BL knee issues   got injxn      Current Outpatient Medications   Medication Sig Dispense Refill    FLUTICASONE PROPIONATE 50 MCG/ACT Nasal Suspension SHAKE LIQUID AND USE 2 SPRAYS IN EACH NOSTRIL DAILY 48 g 0    levothyroxine 112 MCG Oral Tab Take 1 tablet (112 mcg total) by mouth before breakfast. 90 tablet 1    hydroCHLOROthiazide 25 MG Oral Tab Take 1 tablet (25 mg total) by mouth daily. 90 tablet 1    Irbesartan 300 MG Oral Tab Take 1 tablet (300 mg total) by mouth once daily. 90 tablet 1    pravastatin 40 MG Oral Tab Take 1 tablet (40 mg total) by mouth daily. 90 tablet 1    Cyanocobalamin (VITAMIN B12) 1000 MCG Oral Tab CR Take 1 tablet by mouth daily.      Dorzolamide HCl-Timolol Mal PF 2-0.5 % Ophthalmic Solution Place 1 drop into the left eye 2 (two) times daily.      latanoprost 0.005 % Ophthalmic Solution Place 1 drop into both eyes nightly. TAKE AT BEDTIME      triamcinolone acetonide 0.1 % External Cream Apply topically 2 (two) times daily as needed. 60 g 3    aspirin 81 MG Oral Tab Take 1 tablet (81 mg total) by mouth every other day.      Multiple Vitamins-Minerals (MULTIVITAL) Oral Tab Take 1 tablet by mouth daily.      Cholecalciferol (VITAMIN D) 1000 UNITS Oral Cap Take  by mouth every other day.        Past Medical History:    Abdominal pain    Allergic rhinitis    Anemia    Black stools    Body piercing    Change in hair    Chronic cough    Decorative tattoo    Diarrhea, unspecified    Essential hypertension    Eye disease    Feeling lonely    Flatulence/gas pain/belching    Food intolerance    Frequent urination    Glaucoma    High  cholesterol    Hyperlipidemia    Hypothyroidism    Leaking of urine    Night sweats    Osteoarthrosis, unspecified whether generalized or localized, lower leg    Pain in joints    Pain with bowel movements    Pure hypercholesterolemia    Rash    Wears glasses      Social History:  Social History     Socioeconomic History    Marital status: Single   Tobacco Use    Smoking status: Former     Current packs/day: 0.00     Average packs/day: 1 pack/day for 10.0 years (10.0 ttl pk-yrs)     Types: Cigarettes     Start date:      Quit date:      Years since quittin.2    Smokeless tobacco: Never   Vaping Use    Vaping status: Never Used   Substance and Sexual Activity    Alcohol use: Yes     Comment: 2 drinks a month    Drug use: No   Other Topics Concern    Caffeine Concern Yes     Comment: Coffee    Exercise Yes     Comment: stationary bike    Seat Belt Yes    Special Diet No    Stress Concern No    Weight Concern Yes     Social Drivers of Health     Food Insecurity: No Food Insecurity (3/7/2025)    NCSS - Food Insecurity     Worried About Running Out of Food in the Last Year: No     Ran Out of Food in the Last Year: No   Transportation Needs: No Transportation Needs (3/7/2025)    NCSS - Transportation     Lack of Transportation: No   Housing Stability: Not At Risk (3/7/2025)    NCSS - Housing/Utilities     Has Housing: Yes     Worried About Losing Housing: No     Unable to Get Utilities: No        REVIEW OF SYSTEMS:   GENERAL HEALTH: feels well otherwise  SKIN: denies rashes  RESPIRATORY: denies shortness of breath  CARDIOVASCULAR: denies chest pain   GI: denies abdominal pain  NEURO: denies headaches    EXAM:   /80 (BP Location: Left arm, Patient Position: Sitting, Cuff Size: large)   Pulse 59   Temp 98 °F (36.7 °C) (Temporal)   Ht 5' 6\" (1.676 m)   Wt 170 lb 12.8 oz (77.5 kg)   LMP  (LMP Unknown)   SpO2 94%   BMI 27.57 kg/m²   GENERAL: well developed, well nourished,in no apparent  distress  SKIN: no rashes   scalp wo lesions    NECK: supple,no adenopathy  LUNGS: clear to auscultation  CARDIO: RRR without murmur  EXTREMITIES: no edema    ASSESSMENT AND PLAN:   1. Essential hypertension, benign  BP good   CPM w meds   brenda 6 mo       2. Pure hypercholesterolemia  On meds    reviewed labs   brenda 6 mo        3. Hypothyroidism, unspecified type  Last TFTs in range    CPM w meds        4. Hair loss  Seems better   suspect more post illness     thyroid in range      5. Chronic pain of both knees  Sees ortho    Discussed EGD  brenda 5 yr      The patient indicates understanding of these issues and agrees to the plan.  .

## 2025-03-10 RX ORDER — HYDROCHLOROTHIAZIDE 25 MG/1
25 TABLET ORAL DAILY
Qty: 90 TABLET | Refills: 1 | OUTPATIENT
Start: 2025-03-10

## 2025-03-10 RX ORDER — IRBESARTAN 300 MG/1
300 TABLET ORAL DAILY
Qty: 90 TABLET | Refills: 1 | OUTPATIENT
Start: 2025-03-10

## 2025-03-10 RX ORDER — PRAVASTATIN SODIUM 40 MG
40 TABLET ORAL DAILY
Qty: 90 TABLET | Refills: 1 | OUTPATIENT
Start: 2025-03-10

## (undated) DIAGNOSIS — Z13.6 SCREENING FOR CARDIOVASCULAR CONDITION: Primary | ICD-10-CM

## (undated) NOTE — MR AVS SNAPSHOT
After Visit Summary   4/3/2023    Miguel Ángel Miranda   MRN: [id number]           Visit Information     Date & Time  4/3/2023  8:30 AM Provider  Trudy Alvarado [address] Department [facility] Neurodiagnostics Dept. Phone  [phone]      Your Vitals Were     LMP    (LMP Unknown)             Allergies as of 4/3/2023  Review status set to Review Complete on 10/12/2022       Noted Reaction Type Reactions    Azithromycin 01/19/2022    DIARRHEA, PAIN    Seasonal 01/23/2015        Amoxicillin 06/22/2020    ITCHING, RASH    Clindamycin 06/28/2021    DIARRHEA      Your Current Medications        Dosage    PRAVASTATIN 40 MG Oral Tab Take 1 tablet by mouth once daily    hydroCHLOROthiazide 12.5 MG Oral Tab Take 1 tablet (12.5 mg total) by mouth once daily. predniSONE 10 MG Oral Tab 4 PO for 2 days,  3 PO for 2 days   2 PO for 2 days   1 PO for 2 days    fluticasone propionate 50 MCG/ACT Nasal Suspension 2 sprays by Nasal route daily. ibuprofen 600 MG Oral Tab Take 600 mg by mouth 3 (three) times daily. Irbesartan 300 MG Oral Tab Take 1 tablet (300 mg total) by mouth once daily. levothyroxine 112 MCG Oral Tab Take 1 tablet (112 mcg total) by mouth once daily. latanoprost 0.005 % Ophthalmic Solution Place 1 drop into both eyes nightly. TAKE AT BEDTIME    triamcinolone acetonide 0.1 % External Cream Apply topically 2 (two) times daily as needed. aspirin 81 MG Oral Tab Take 81 mg by mouth every other day. Multiple Vitamins-Minerals (MULTIVITAL) Oral Tab Take 1 Tab by mouth daily. Cholecalciferol (VITAMIN D) 1000 UNITS Oral Cap Take  by mouth every other day. Diagnoses for This Visit    Tingling sensation   [995191]             We Ordered the Following     Normal Orders This Visit    EMG [NEU5 CUSTOM]                 Did you know that [facility] primary care physicians now offer Video Visits through [address] for adult patients for a variety of conditions such as allergies, back pain and cold symptoms? Skip the drive and waiting room and online chat with a doctor face-to-face using your web-cam enabled computer or mobile device wherever you are. Video Visits cost $50 and can be paid hassle-free using a credit, debit, or health savings card. Not active on [employer]? Ask us how to get signed up today! If you receive a survey from [employer], please take a few minutes to complete it and provide feedback. We strive to deliver the best patient experience and are looking for ways to make improvements. Your feedback will help us do so. For more information on [employer], please visit [email or web address]           No text in SmartText           No text in SmartText

## (undated) NOTE — MR AVS SNAPSHOT
[place]  [address] [facility] [address]  [address] [facility] [address] [phone]               Thank you for choosing us for your health care visit with Duran Anderson MD.  We are glad to serve you and happy to provide you with this sum subtle changes and your final reading will be delayed until we receive them.   There are no eating or activity restrictions for this exam.              Today's Orders     KATELYN DIGITAL SCRN BILCHERYL W/CAD (CPT=/07224)    Complete by:  Feb 24, 2017 (Approxim Vitamin D 1000 units Caps   Take  by mouth every other day.                 Where to Get Your Medications      These medications were sent to [facility], [id number] [address] Interpretation of CHOL/HDL ratios:      Male:          Female:          Risk:      3.43           3.27             One half average      4.97           4.44             Average      9.55           7.05             Twice average      23.99         11.04 Summaries. If you've been to the Emergency Department or your doctor's office, you can view your past visit information in [facility] by going to Visits < Visit Summaries. [facility] questions? Call [phone] for help.   [facility] is NOT to be used for urge